# Patient Record
Sex: MALE | Race: WHITE | Employment: FULL TIME | URBAN - METROPOLITAN AREA
[De-identification: names, ages, dates, MRNs, and addresses within clinical notes are randomized per-mention and may not be internally consistent; named-entity substitution may affect disease eponyms.]

---

## 2022-01-25 ENCOUNTER — HOSPITAL ENCOUNTER (EMERGENCY)
Age: 52
Discharge: HOME | End: 2022-01-25
Attending: EMERGENCY MEDICINE | Admitting: EMERGENCY MEDICINE
Payer: COMMERCIAL

## 2022-01-25 ENCOUNTER — NURSE TRIAGE (OUTPATIENT)
Dept: OTHER | Facility: CLINIC | Age: 52
End: 2022-01-25

## 2022-01-25 ENCOUNTER — APPOINTMENT (OUTPATIENT)
Dept: VASCULAR SURGERY | Age: 52
End: 2022-01-25
Attending: EMERGENCY MEDICINE
Payer: COMMERCIAL

## 2022-01-25 VITALS
OXYGEN SATURATION: 97 % | TEMPERATURE: 98.5 F | RESPIRATION RATE: 18 BRPM | DIASTOLIC BLOOD PRESSURE: 79 MMHG | SYSTOLIC BLOOD PRESSURE: 149 MMHG | HEART RATE: 107 BPM

## 2022-01-25 DIAGNOSIS — I82.4Y2 ACUTE DEEP VEIN THROMBOSIS (DVT) OF PROXIMAL VEIN OF LEFT LOWER EXTREMITY (HCC): Primary | ICD-10-CM

## 2022-01-25 LAB
ALBUMIN SERPL-MCNC: 4.1 G/DL (ref 3.5–5)
ALBUMIN/GLOB SERPL: 1.1 {RATIO} (ref 1.1–2.2)
ALP SERPL-CCNC: 82 U/L (ref 45–117)
ALT SERPL-CCNC: 27 U/L (ref 12–78)
ANION GAP SERPL CALC-SCNC: 6 MMOL/L (ref 5–15)
APTT PPP: 27.9 SEC (ref 22.1–31)
AST SERPL-CCNC: 18 U/L (ref 15–37)
BASOPHILS # BLD: 0 K/UL (ref 0–0.1)
BASOPHILS NFR BLD: 0 % (ref 0–1)
BILIRUB SERPL-MCNC: 0.7 MG/DL (ref 0.2–1)
BUN SERPL-MCNC: 11 MG/DL (ref 6–20)
BUN/CREAT SERPL: 12 (ref 12–20)
CALCIUM SERPL-MCNC: 9.5 MG/DL (ref 8.5–10.1)
CHLORIDE SERPL-SCNC: 103 MMOL/L (ref 97–108)
CO2 SERPL-SCNC: 29 MMOL/L (ref 21–32)
COMMENT, HOLDF: NORMAL
CREAT SERPL-MCNC: 0.94 MG/DL (ref 0.7–1.3)
DIFFERENTIAL METHOD BLD: NORMAL
EOSINOPHIL # BLD: 0.2 K/UL (ref 0–0.4)
EOSINOPHIL NFR BLD: 2 % (ref 0–7)
ERYTHROCYTE [DISTWIDTH] IN BLOOD BY AUTOMATED COUNT: 11.9 % (ref 11.5–14.5)
GLOBULIN SER CALC-MCNC: 3.8 G/DL (ref 2–4)
GLUCOSE SERPL-MCNC: 102 MG/DL (ref 65–100)
HCT VFR BLD AUTO: 47.3 % (ref 36.6–50.3)
HGB BLD-MCNC: 16.2 G/DL (ref 12.1–17)
IMM GRANULOCYTES # BLD AUTO: 0 K/UL (ref 0–0.04)
IMM GRANULOCYTES NFR BLD AUTO: 0 % (ref 0–0.5)
INR PPP: 1 (ref 0.9–1.1)
LYMPHOCYTES # BLD: 1.9 K/UL (ref 0.8–3.5)
LYMPHOCYTES NFR BLD: 26 % (ref 12–49)
MCH RBC QN AUTO: 31.3 PG (ref 26–34)
MCHC RBC AUTO-ENTMCNC: 34.2 G/DL (ref 30–36.5)
MCV RBC AUTO: 91.5 FL (ref 80–99)
MONOCYTES # BLD: 0.7 K/UL (ref 0–1)
MONOCYTES NFR BLD: 9 % (ref 5–13)
NEUTS SEG # BLD: 4.4 K/UL (ref 1.8–8)
NEUTS SEG NFR BLD: 63 % (ref 32–75)
NRBC # BLD: 0 K/UL (ref 0–0.01)
NRBC BLD-RTO: 0 PER 100 WBC
PLATELET # BLD AUTO: 163 K/UL (ref 150–400)
PMV BLD AUTO: 10.2 FL (ref 8.9–12.9)
POTASSIUM SERPL-SCNC: 3.9 MMOL/L (ref 3.5–5.1)
PROT SERPL-MCNC: 7.9 G/DL (ref 6.4–8.2)
PROTHROMBIN TIME: 10.9 SEC (ref 9–11.1)
RBC # BLD AUTO: 5.17 M/UL (ref 4.1–5.7)
SAMPLES BEING HELD,HOLD: NORMAL
SODIUM SERPL-SCNC: 138 MMOL/L (ref 136–145)
THERAPEUTIC RANGE,PTTT: NORMAL SECS (ref 58–77)
WBC # BLD AUTO: 7.2 K/UL (ref 4.1–11.1)

## 2022-01-25 PROCEDURE — 99282 EMERGENCY DEPT VISIT SF MDM: CPT

## 2022-01-25 PROCEDURE — 36415 COLL VENOUS BLD VENIPUNCTURE: CPT

## 2022-01-25 PROCEDURE — 80053 COMPREHEN METABOLIC PANEL: CPT

## 2022-01-25 PROCEDURE — 85025 COMPLETE CBC W/AUTO DIFF WBC: CPT

## 2022-01-25 PROCEDURE — 85730 THROMBOPLASTIN TIME PARTIAL: CPT

## 2022-01-25 PROCEDURE — 85610 PROTHROMBIN TIME: CPT

## 2022-01-25 PROCEDURE — 93971 EXTREMITY STUDY: CPT

## 2022-01-25 RX ORDER — APIXABAN 5 MG (74)
KIT ORAL
Qty: 1 DOSE PACK | Refills: 0 | Status: SHIPPED | OUTPATIENT
Start: 2022-01-25 | End: 2022-01-25 | Stop reason: SDUPTHER

## 2022-01-25 RX ORDER — APIXABAN 5 MG (74)
KIT ORAL
Qty: 1 DOSE PACK | Refills: 0 | Status: SHIPPED | OUTPATIENT
Start: 2022-01-25 | End: 2022-01-28 | Stop reason: DRUGHIGH

## 2022-01-25 NOTE — ED PROVIDER NOTES
Neida Sacks is a 55 yo M with no significant medical history who presents to the ED with LLE Swelling. He states that he has had problems with swelling in his left leg for several years that would get worse when he travels. About a week ago he noticed a more severe pain in his right calf which lasted several minutes but since has been more of his typical dull ache but he has noticed increased swelling. He tried to set up a new patient appointment with the [facility] and was advised to come to the ED for evaluation. He denies chest pain or shortness of breath. History reviewed. No pertinent past medical history. Past Surgical History:   Procedure Laterality Date    HX ORTHOPAEDIC      HX SHOULDER ARTHROSCOPY           History reviewed. No pertinent family history. Social History     Socioeconomic History    Marital status:      Spouse name: Not on file    Number of children: Not on file    Years of education: Not on file    Highest education level: Not on file   Occupational History    Not on file   Tobacco Use    Smoking status: Never Smoker    Smokeless tobacco: Never Used   Substance and Sexual Activity    Alcohol use: Yes     Comment: socially    Drug use: Not Currently    Sexual activity: Not on file   Other Topics Concern    Not on file   Social History Narrative    Not on file     Social Determinants of Health     Financial Resource Strain:     Difficulty of Paying Living Expenses: Not on file   Food Insecurity:     Worried About Running Out of Food in the Last Year: Not on file    Jc of Food in the Last Year: Not on file   Transportation Needs:     Lack of Transportation (Medical): Not on file    Lack of Transportation (Non-Medical):  Not on file   Physical Activity:     Days of Exercise per Week: Not on file    Minutes of Exercise per Session: Not on file   Stress:     Feeling of Stress : Not on file   Social Connections:     Frequency of Communication with Friends and Family: Not on file    Frequency of Social Gatherings with Friends and Family: Not on file    Attends Taoist Services: Not on file    Active Member of Clubs or Organizations: Not on file    Attends Club or Organization Meetings: Not on file    Marital Status: Not on file   Intimate Partner Violence:     Fear of Current or Ex-Partner: Not on file    Emotionally Abused: Not on file    Physically Abused: Not on file    Sexually Abused: Not on file   Housing Stability:     Unable to Pay for Housing in the Last Year: Not on file    Number of [place] in the Last Year: Not on file    Unstable Housing in the Last Year: Not on file         ALLERGIES: Patient has no known allergies. Review of Systems   Constitutional: Negative for fever. HENT: Negative for sore throat. Eyes: Negative for visual disturbance. Respiratory: Negative for cough. Cardiovascular: Positive for leg swelling (left). Negative for chest pain. Gastrointestinal: Negative for abdominal pain. Genitourinary: Negative for dysuria. Musculoskeletal: Negative for back pain. Skin: Negative for rash. Neurological: Negative for headaches. Vitals:    01/25/22 1136   BP: (!) 149/79   Pulse: (!) 107   Resp: 18   Temp: 98.5 °F (36.9 °C)   SpO2: 97%            Physical Exam  Vitals and nursing note reviewed. Constitutional:       General: He is not in acute distress. Appearance: He is well-developed. HENT:      Head: Normocephalic and atraumatic. Eyes:      Conjunctiva/sclera: Conjunctivae normal.   Neck:      Trachea: Phonation normal.   Cardiovascular:      Rate and Rhythm: Normal rate. Pulmonary:      Effort: Pulmonary effort is normal. No respiratory distress. Abdominal:      General: There is no distension. Musculoskeletal:         General: Normal range of motion. Cervical back: Normal range of motion. Left lower leg: Tenderness (calf) present.  Edema present. Skin:     General: Skin is warm and dry. Neurological:      Mental Status: He is alert. He is not disoriented. Motor: No abnormal muscle tone. MDM         5:17 PM  Vascular US significant for DVT from left mid and distal femoral vein to popliteal and gastroc veins. CBC anc CMP normal will treat with eliquis. Prescribed starter pack. Patient to follow-up with [facility] to continue treatment and establish primary care.      Procedures

## 2022-01-25 NOTE — TELEPHONE ENCOUNTER
Received call from Chino Mcqueen at [facility] with Red Flag Complaint. Subjective: Caller states \"It's very warm to the touch. 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 As I press around ankle bone there is indentation that fills back up, as well. \"     Current Symptoms:   LLE edema within 1-2 weeks has gotten worse from ankle to knee area with \"purplish discoloration\"    Pitting edema to ankle area, warm to the touch, and intermittently itchy     \"Achy feeling\" to ankle/calf area     Tightness in calf area especially as walking and stretching     Denies shortness of breath     Onset: 2 weeks ago; worsening     Associated Symptoms: NA    Pain Severity: N/A    Temperature: N/A    What has been tried: Stretching    LMP: NA Pregnant: NA    Recommended disposition: GO TO ED NOW. Care advice provided, patient verbalizes understanding; denies any other questions or concerns; instructed to call back for any new or worsening symptoms. Patient proceeding to closest Emergency Department    Attention Provider: Thank you for allowing me to participate in the care of your patient. The patient was connected to triage in response to information provided to the [facility]. Please do not respond through this encounter as the response is not directed to a shared pool.     Reason for Disposition   Thigh, calf, or ankle swelling in only one leg    Protocols used: LEG SWELLING AND EDEMA-ADULT-OH

## 2022-01-28 ENCOUNTER — OFFICE VISIT (OUTPATIENT)
Dept: PRIMARY CARE CLINIC | Age: 52
End: 2022-01-28
Payer: COMMERCIAL

## 2022-01-28 VITALS
TEMPERATURE: 98.4 F | HEIGHT: 74 IN | HEART RATE: 86 BPM | SYSTOLIC BLOOD PRESSURE: 128 MMHG | OXYGEN SATURATION: 97 % | DIASTOLIC BLOOD PRESSURE: 82 MMHG | RESPIRATION RATE: 16 BRPM | WEIGHT: 224 LBS | BODY MASS INDEX: 28.75 KG/M2

## 2022-01-28 DIAGNOSIS — I82.4Y2 ACUTE DEEP VEIN THROMBOSIS (DVT) OF PROXIMAL VEIN OF LEFT LOWER EXTREMITY (HCC): Primary | ICD-10-CM

## 2022-01-28 PROCEDURE — 99203 OFFICE O/P NEW LOW 30 MIN: CPT | Performed by: FAMILY MEDICINE

## 2022-01-28 RX ORDER — ACETAMINOPHEN 325 MG/1
TABLET ORAL
COMMUNITY

## 2022-01-28 NOTE — PROGRESS NOTES
HPI     Chief Complaint   Patient presents with   [address]     has blood clots in left leg- swelling- red- no brusing or bleeding per pt     He is a 54 y.o. male who presents for establishing care. Patient went to the ER and was diagnosed with a blood clot in his L leg. States he was started on Eliquis starter pack. Denies bleeding, bruising on Eliquis. States he used to travel a lot pre COVID and would always have swelling when he would travel. States typically he would exercise and it would go away. States he is very sedentary for his job. 2 weeks ago he started having swelling in L leg but it never went away. Started to have aching. States he had been up the night before working at his desk when it started swelling but no travel around that time. No recent surgeries. States last travel was Dec 17-Dec 19 was from [place] to [facility] which is about a 2 hour flight. States his Dad  of a MI. Mom had emphysema. GF had a MI. No autoimmune conditions in the family. Review of Systems   Respiratory: Negative for shortness of breath. Cardiovascular: Negative for chest pain. Reviewed PmHx, RxHx, FmHx, SocHx, AllgHx and updated and dated in the chart. Physical Exam:  Visit Vitals  /82   Pulse 86   Temp 98.4 °F (36.9 °C) (Temporal)   Resp 16   Ht 6' 2\" (1.88 m)   Wt 224 lb (101.6 kg)   SpO2 97%   BMI 28.76 kg/m²     Physical Exam  Vitals and nursing note reviewed. Constitutional:       General: He is not in acute distress. Appearance: Normal appearance. He is not ill-appearing. Cardiovascular:      Rate and Rhythm: Normal rate and regular rhythm. Heart sounds: No murmur heard. Pulmonary:      Effort: Pulmonary effort is normal. No respiratory distress. Breath sounds: Normal breath sounds. Musculoskeletal:      Right lower leg: No edema. Left lower leg: Edema present. Comments: LLE 1+, mildly erythematous. Post tibial pulse present BL.   L post tibial pulse with doppler - 84  L dorsalis pedis pulse with doppler - 80   Skin:     General: Skin is warm. Neurological:      General: No focal deficit present. Mental Status: He is alert. Psychiatric:         Mood and Affect: Mood normal.         Behavior: Behavior normal.       No results found for this or any previous visit (from the past 12 hour(s)). Assessment / Plan     Diagnoses and all orders for this visit:    1. Acute deep vein thrombosis (DVT) of proximal vein of left lower extremity (HCC)  -     apixaban (Eliquis) 5 mg tablet; Take 1 Tablet by mouth two (2) times a day. -     REFERRAL TO HEMATOLOGY       Discussed do not use NSAIDs with AC. Needs to follow up with Heme as can not identify provoking factor and guidance on how long he should be on [facility] or if he needs further testing. VSS today. Discussed ER precautions. I have discussed the diagnosis with the patient and the intended plan as seen in the above orders. The patient has received an after-visit summary and questions were answered concerning future plans. I have discussed medication side effects and warnings with the patient as well.     Briseida Huynh, DO

## 2022-01-28 NOTE — PROGRESS NOTES
Chief Complaint   Patient presents with   [address]     has blood clots in left leg       Health Maintenance Due   Topic    Hepatitis C Screening     DTaP/Tdap/Td series (1 - Tdap)    Lipid Screen     Colorectal Cancer Screening Combo     Flu Vaccine (1)        1. Have you been to the ER, urgent care clinic since your last visit? Hospitalized since your last visit? Yes Reason for visit: 01/25/21- [facility]- blood clots    2. Have you seen or consulted any other health care providers outside of the [address] [place] [address] since your last visit? Include any pap smears or colon screening.  No    Visit Vitals  BP (!) 134/94 (BP 1 Location: Left upper arm, BP Patient Position: Sitting)   Pulse 86   Temp 98.4 °F (36.9 °C) (Temporal)   Resp 16   Ht 6' 2\" (1.88 m)   Wt 224 lb (101.6 kg)   SpO2 97%   BMI 28.76 kg/m²

## 2022-01-28 NOTE — PATIENT INSTRUCTIONS
Deep Vein Thrombosis: Care Instructions  Overview     A deep vein thrombosis (DVT) is a blood clot in certain veins, usually in the legs, pelvis, or arms. Blood clots in these veins need to be treated because they can get bigger, break loose, and travel through the bloodstream to the lungs. A blood clot in a lung can be life-threatening. The doctor may have given you a blood thinner (anticoagulant). A blood thinner can stop the blood clot from growing larger and prevent new clots from forming. You will need to take a blood thinner for at least 3 months. The doctor has checked you carefully, but problems can develop later. If you notice any problems or new symptoms, get medical treatment right away. Follow-up care is a key part of your treatment and safety. Be sure to make and go to all appointments, and call your doctor if you are having problems. It's also a good idea to know your test results and keep a list of the medicines you take. How can you care for yourself at home? · Take your medicines exactly as prescribed. Call your doctor if you think you are having a problem with your medicine. · If you are taking a blood thinner, be sure you get instructions about how to take your medicine safely. Blood thinners can cause serious bleeding problems. · Try to walk several times a day. · Wear compression stockings if your doctor recommends them. These stockings are tighter at the feet than on the legs. They may reduce pain and swelling in your legs. But there are different types of stockings, and they need to fit right. So your doctor will recommend what you need. · When you sit, use a pillow to raise the arm or leg that has the blood clot. Try to keep it above the level of your heart. When should you call for help? Call 911 anytime you think you may need emergency care.  For example, call if:    · You passed out (lost consciousness).     · You have symptoms of a blood clot in your lung (called a pulmonary embolism). These include:  ? Sudden chest pain. ? Trouble breathing. ? Coughing up blood. Call your doctor now or seek immediate medical care if:    · You have new or worse trouble breathing.     · You are dizzy or lightheaded, or you feel like you may faint.     · You have symptoms of a blood clot in your arm or leg. These may include:  ? Pain in the arm, calf, back of the knee, thigh, or groin. ? Redness and swelling in the arm, leg, or groin. Watch closely for changes in your health, and be sure to contact your doctor if:    · You do not get better as expected. Where can you learn more? Go to http://www.gray.com/  Enter Z760 in the search box to learn more about \"Deep Vein Thrombosis: Care Instructions. \"  Current as of: July 6, 2021               Content Version: 13.0  © 2006-2021 [employer]. Care instructions adapted under license by [employer] (which disclaims liability or warranty for this information).  If you have questions about a medical condition or this instruction, always ask your healthcare professional. [place] [address] any warranty or liability for your use of this information.

## 2022-02-10 ENCOUNTER — APPOINTMENT (OUTPATIENT)
Dept: VASCULAR SURGERY | Age: 52
DRG: 270 | End: 2022-02-10
Attending: EMERGENCY MEDICINE
Payer: COMMERCIAL

## 2022-02-10 ENCOUNTER — TELEPHONE (OUTPATIENT)
Dept: PRIMARY CARE CLINIC | Age: 52
End: 2022-02-10

## 2022-02-10 ENCOUNTER — HOSPITAL ENCOUNTER (INPATIENT)
Age: 52
LOS: 2 days | Discharge: HOME | DRG: 270 | End: 2022-02-12
Attending: EMERGENCY MEDICINE | Admitting: INTERNAL MEDICINE
Payer: COMMERCIAL

## 2022-02-10 DIAGNOSIS — I82.4Y2 ACUTE DEEP VEIN THROMBOSIS (DVT) OF PROXIMAL VEIN OF LEFT LOWER EXTREMITY (HCC): Primary | ICD-10-CM

## 2022-02-10 PROBLEM — I82.402 ACUTE DEEP VEIN THROMBOSIS (DVT) OF LEFT LOWER EXTREMITY (HCC): Status: ACTIVE | Noted: 2022-02-10

## 2022-02-10 LAB
ABO + RH BLD: NORMAL
ALBUMIN SERPL-MCNC: 4.4 G/DL (ref 3.5–5)
ALBUMIN/GLOB SERPL: 1.1 {RATIO} (ref 1.1–2.2)
ALP SERPL-CCNC: 71 U/L (ref 45–117)
ALT SERPL-CCNC: 26 U/L (ref 12–78)
ANION GAP SERPL CALC-SCNC: 3 MMOL/L (ref 5–15)
APTT PPP: 28.2 SEC (ref 22.1–31)
APTT PPP: 28.4 SEC (ref 22.1–31)
AST SERPL-CCNC: 15 U/L (ref 15–37)
BASOPHILS # BLD: 0 K/UL (ref 0–0.1)
BASOPHILS NFR BLD: 1 % (ref 0–1)
BILIRUB SERPL-MCNC: 0.7 MG/DL (ref 0.2–1)
BLOOD GROUP ANTIBODIES SERPL: NORMAL
BUN SERPL-MCNC: 12 MG/DL (ref 6–20)
BUN/CREAT SERPL: 14 (ref 12–20)
CALCIUM SERPL-MCNC: 9.5 MG/DL (ref 8.5–10.1)
CHLORIDE SERPL-SCNC: 103 MMOL/L (ref 97–108)
CO2 SERPL-SCNC: 29 MMOL/L (ref 21–32)
COMMENT, HOLDF: NORMAL
COVID-19 RAPID TEST, COVR: NOT DETECTED
CREAT SERPL-MCNC: 0.86 MG/DL (ref 0.7–1.3)
DIFFERENTIAL METHOD BLD: NORMAL
EOSINOPHIL # BLD: 0.1 K/UL (ref 0–0.4)
EOSINOPHIL NFR BLD: 2 % (ref 0–7)
ERYTHROCYTE [DISTWIDTH] IN BLOOD BY AUTOMATED COUNT: 12.3 % (ref 11.5–14.5)
GLOBULIN SER CALC-MCNC: 4 G/DL (ref 2–4)
GLUCOSE SERPL-MCNC: 90 MG/DL (ref 65–100)
HCT VFR BLD AUTO: 47.2 % (ref 36.6–50.3)
HGB BLD-MCNC: 15.5 G/DL (ref 12.1–17)
IMM GRANULOCYTES # BLD AUTO: 0 K/UL (ref 0–0.04)
IMM GRANULOCYTES NFR BLD AUTO: 0 % (ref 0–0.5)
LYMPHOCYTES # BLD: 2 K/UL (ref 0.8–3.5)
LYMPHOCYTES NFR BLD: 34 % (ref 12–49)
MCH RBC QN AUTO: 30.8 PG (ref 26–34)
MCHC RBC AUTO-ENTMCNC: 32.8 G/DL (ref 30–36.5)
MCV RBC AUTO: 93.8 FL (ref 80–99)
MONOCYTES # BLD: 0.5 K/UL (ref 0–1)
MONOCYTES NFR BLD: 9 % (ref 5–13)
NEUTS SEG # BLD: 3.3 K/UL (ref 1.8–8)
NEUTS SEG NFR BLD: 54 % (ref 32–75)
NRBC # BLD: 0 K/UL (ref 0–0.01)
NRBC BLD-RTO: 0 PER 100 WBC
PLATELET # BLD AUTO: 248 K/UL (ref 150–400)
PMV BLD AUTO: 10 FL (ref 8.9–12.9)
POTASSIUM SERPL-SCNC: 3.7 MMOL/L (ref 3.5–5.1)
PROT SERPL-MCNC: 8.4 G/DL (ref 6.4–8.2)
RBC # BLD AUTO: 5.03 M/UL (ref 4.1–5.7)
SAMPLES BEING HELD,HOLD: NORMAL
SODIUM SERPL-SCNC: 135 MMOL/L (ref 136–145)
SOURCE, COVRS: NORMAL
SPECIMEN EXP DATE BLD: NORMAL
THERAPEUTIC RANGE,PTTT: NORMAL SECS (ref 58–77)
THERAPEUTIC RANGE,PTTT: NORMAL SECS (ref 58–77)
WBC # BLD AUTO: 6.1 K/UL (ref 4.1–11.1)

## 2022-02-10 PROCEDURE — 65270000032 HC RM SEMIPRIVATE

## 2022-02-10 PROCEDURE — 74011250636 HC RX REV CODE- 250/636: Performed by: PHYSICIAN ASSISTANT

## 2022-02-10 PROCEDURE — 87635 SARS-COV-2 COVID-19 AMP PRB: CPT

## 2022-02-10 PROCEDURE — 93971 EXTREMITY STUDY: CPT

## 2022-02-10 PROCEDURE — 80053 COMPREHEN METABOLIC PANEL: CPT

## 2022-02-10 PROCEDURE — 85730 THROMBOPLASTIN TIME PARTIAL: CPT

## 2022-02-10 PROCEDURE — 36415 COLL VENOUS BLD VENIPUNCTURE: CPT

## 2022-02-10 PROCEDURE — 99284 EMERGENCY DEPT VISIT MOD MDM: CPT

## 2022-02-10 PROCEDURE — 85025 COMPLETE CBC W/AUTO DIFF WBC: CPT

## 2022-02-10 PROCEDURE — 86900 BLOOD TYPING SEROLOGIC ABO: CPT

## 2022-02-10 RX ORDER — ONDANSETRON 4 MG/1
4 TABLET, ORALLY DISINTEGRATING ORAL
Status: DISCONTINUED | OUTPATIENT
Start: 2022-02-10 | End: 2022-02-12

## 2022-02-10 RX ORDER — HEPARIN SODIUM 10000 [USP'U]/100ML
18-36 INJECTION, SOLUTION INTRAVENOUS
Status: DISCONTINUED | OUTPATIENT
Start: 2022-02-10 | End: 2022-02-10 | Stop reason: RX

## 2022-02-10 RX ORDER — ONDANSETRON 2 MG/ML
4 INJECTION INTRAMUSCULAR; INTRAVENOUS
Status: DISCONTINUED | OUTPATIENT
Start: 2022-02-10 | End: 2022-02-12

## 2022-02-10 RX ORDER — ACETAMINOPHEN 325 MG/1
650 TABLET ORAL
Status: DISCONTINUED | OUTPATIENT
Start: 2022-02-10 | End: 2022-02-10

## 2022-02-10 RX ORDER — HEPARIN SODIUM 1000 [USP'U]/ML
80 INJECTION, SOLUTION INTRAVENOUS; SUBCUTANEOUS ONCE
Status: COMPLETED | OUTPATIENT
Start: 2022-02-10 | End: 2022-02-10

## 2022-02-10 RX ORDER — MORPHINE SULFATE 2 MG/ML
2 INJECTION, SOLUTION INTRAMUSCULAR; INTRAVENOUS
Status: DISCONTINUED | OUTPATIENT
Start: 2022-02-10 | End: 2022-02-12

## 2022-02-10 RX ORDER — POLYETHYLENE GLYCOL 3350 17 G/17G
17 POWDER, FOR SOLUTION ORAL DAILY PRN
Status: DISCONTINUED | OUTPATIENT
Start: 2022-02-10 | End: 2022-02-12 | Stop reason: HOSPADM

## 2022-02-10 RX ORDER — SODIUM CHLORIDE 0.9 % (FLUSH) 0.9 %
5-40 SYRINGE (ML) INJECTION AS NEEDED
Status: DISCONTINUED | OUTPATIENT
Start: 2022-02-10 | End: 2022-02-12 | Stop reason: HOSPADM

## 2022-02-10 RX ORDER — SODIUM CHLORIDE 0.9 % (FLUSH) 0.9 %
5-40 SYRINGE (ML) INJECTION EVERY 8 HOURS
Status: DISCONTINUED | OUTPATIENT
Start: 2022-02-10 | End: 2022-02-12 | Stop reason: HOSPADM

## 2022-02-10 RX ORDER — ACETAMINOPHEN 325 MG/1
650 TABLET ORAL
Status: DISCONTINUED | OUTPATIENT
Start: 2022-02-10 | End: 2022-02-12 | Stop reason: HOSPADM

## 2022-02-10 RX ORDER — ACETAMINOPHEN 650 MG/1
650 SUPPOSITORY RECTAL
Status: DISCONTINUED | OUTPATIENT
Start: 2022-02-10 | End: 2022-02-12

## 2022-02-10 RX ADMIN — HEPARIN SODIUM 14 UNITS/KG/HR: 1000 INJECTION INTRAVENOUS; SUBCUTANEOUS at 21:32

## 2022-02-10 RX ADMIN — HEPARIN SODIUM 8130 UNITS: 1000 INJECTION, SOLUTION INTRAVENOUS; SUBCUTANEOUS at 21:31

## 2022-02-10 NOTE — TELEPHONE ENCOUNTER
Call placed to patient at recommendation of NP unable to reach patient on the phone. spoke with spouse she stated he was on a conference call. Explained that with the symptoms he was experiencing it is suggested that he go to ER.    She stated she would let the patient know

## 2022-02-10 NOTE — ED PROVIDER NOTES
54year old nguyen lindsay presenting to the ED for leg swelling. Was seen here on 1/25 and diagnosed with DVT. Reports that he has beem compliant with eliquis. Went to see his PCP on 1/28. Has an appt with hematology Next Thursday but notes that pain and swelling have been increasing, called his PCP and was referred here. Patient denies any chest pain, shortness of breath, hemoptysis. No known instigating event, no recent immobilization. No family history of coagulopathy. Patient notes a moderate aching pain in the left lower leg, worse with standing. Notes that swelling somewhat improved when he elevates it overnight but then returns. PMHx: possible hx elevated BP, dx with DVT on 1/25  PSx: shoulder arthroscopy    The history is provided by the patient and medical records. Leg Swelling  Pertinent negatives include no chest pain and no shortness of breath. No past medical history on file. Past Surgical History:   Procedure Laterality Date    HX ORTHOPAEDIC      HX SHOULDER ARTHROSCOPY           No family history on file.     Social History     Socioeconomic History    Marital status:      Spouse name: Not on file    Number of children: Not on file    Years of education: Not on file    Highest education level: Not on file   Occupational History    Not on file   Tobacco Use    Smoking status: Never Smoker    Smokeless tobacco: Never Used   Vaping Use    Vaping Use: Never used   Substance and Sexual Activity    Alcohol use: Yes     Comment: socially    Drug use: Not Currently    Sexual activity: Not on file   Other Topics Concern    Not on file   Social History Narrative    Not on file     Social Determinants of Health     Financial Resource Strain:     Difficulty of Paying Living Expenses: Not on file   Food Insecurity:     Worried About Running Out of Food in the Last Year: Not on file    Jc of Food in the Last Year: Not on file   Transportation Needs:     Lack of Transportation (Medical): Not on file    Lack of Transportation (Non-Medical): Not on file   Physical Activity:     Days of Exercise per Week: Not on file    Minutes of Exercise per Session: Not on file   Stress:     Feeling of Stress : Not on file   Social Connections:     Frequency of Communication with Friends and Family: Not on file    Frequency of Social Gatherings with Friends and Family: Not on file    Attends Worship Services: Not on file    Active Member of [address] [place] [address] or Organizations: Not on file    Attends Club or Organization Meetings: Not on file    Marital Status: Not on file   Intimate Partner Violence:     Fear of Current or Ex-Partner: Not on file    Emotionally Abused: Not on file    Physically Abused: Not on file    Sexually Abused: Not on file   Housing Stability:     Unable to Pay for Housing in the Last Year: Not on file    Number of [place] in the Last Year: Not on file    Unstable Housing in the Last Year: Not on file         ALLERGIES: Patient has no known allergies. Review of Systems   Constitutional: Negative for fever. HENT: Negative for facial swelling. Respiratory: Negative for shortness of breath. Cardiovascular: Positive for leg swelling. Negative for chest pain. Gastrointestinal: Negative for vomiting. Musculoskeletal: Positive for myalgias. Skin: Negative for wound. Neurological: Negative for syncope. All other systems reviewed and are negative. Vitals:    02/10/22 1635 02/10/22 1947   BP: (!) 144/93    Pulse: 80    Resp: 18    Temp: 98.2 °F (36.8 °C)    SpO2: 99%    Weight:  101.6 kg (224 lb)   Height:  6' 2\" (1.88 m)            Physical Exam  Vitals and nursing note reviewed. Constitutional:       General: He is not in acute distress. Appearance: He is well-developed. Comments: Pleasant, well-appearing, no distress   HENT:      Head: Normocephalic and atraumatic.       Right Ear: External ear normal.      Left Ear: External ear normal.   Eyes:      General: No scleral icterus. Conjunctiva/sclera: Conjunctivae normal.   Neck:      Trachea: No tracheal deviation. Cardiovascular:      Rate and Rhythm: Normal rate and regular rhythm. Heart sounds: Normal heart sounds. No murmur heard. No friction rub. No gallop. Pulmonary:      Effort: Pulmonary effort is normal. No respiratory distress. Breath sounds: Normal breath sounds. No stridor. No wheezing. Abdominal:      General: There is no distension. Palpations: Abdomen is soft. Musculoskeletal:         General: Swelling present. Normal range of motion. Cervical back: Neck supple. Left lower leg: Edema present. Comments: Left lower leg: Significantly swollen when compared to the right.  + Increase superficial varicosities. Difficult to palpate pedal pulses, however they are appreciable with Doppler, the foot is warm and well-perfused with brisk capillary refill. Skin:     General: Skin is warm and dry. Neurological:      Mental Status: He is alert and oriented to person, place, and time. Psychiatric:         Behavior: Behavior normal.          MDM  Number of Diagnoses or Management Options  Acute deep vein thrombosis (DVT) of proximal vein of left lower extremity (HCC)  Diagnosis management comments: 47-year-old male presenting to the ED for increasing pain, swelling in the left leg, diagnosed with DVT on 1/25 and started on appropriate anticoagulation. Duplex today shows no increase in length of known clot, however areas that were previously nonocclusive are now occlusive. Discussed with vascular, recommended heparin drip, will see in the morning to determine if thrombectomy would be indicated. Medicine consulted for admission.        Amount and/or Complexity of Data Reviewed  Clinical lab tests: ordered and reviewed  Tests in the radiology section of CPT®: ordered and reviewed  Review and summarize past medical records: yes  Discuss the patient with other providers: yes (Dr. Wil Pathak, ED attending. Vascular surgery.)    Risk of Complications, Morbidity, and/or Mortality  General comments: Total critical care time spent exclusive of procedures:  40 minutes - chart review from prior visit, failed anticoagulation, vascular surgery consult, heparin drip           Procedures               Discussed with Dr. Aliyah Ortiz, vascular. Agrees with admission for heparin drip, possible thrombolysis in the AM.  Will consult medicine. REBEKAH Mercado  7:37 PM    Perfect Serve Consult for Admission  7:53 PM    ED Room Number: ER10/10  Patient Name and age:  Bev Hernandez 54 y.o.  male  Working Diagnosis:   1.  Acute deep vein thrombosis (DVT) of proximal vein of left lower extremity ([address]        COVID-19 Suspicion:  no  Sepsis present:  no  Reassessment needed: no  Code Status:  Full Code  Readmission: no  Isolation Requirements:  no  Recommended Level of Care:  telemetry  Department:[facility] Adult ED - 21   Other:  Dx with DVT on 1/25 - started on eliquis - has been compliant - came in for worsening symptoms - duplex shows more occlusive thrombus - discussed with vascular surgery, wants pt started on heparin drip, may need procedure tomorrow

## 2022-02-10 NOTE — ED TRIAGE NOTES
Patient reports left leg DVT diagnosed 2.5 weeks that has gotten more swollen over the last week. On day 17 of Eliquis.  Denies shortness of breath

## 2022-02-11 ENCOUNTER — ANESTHESIA (OUTPATIENT)
Dept: CARDIOTHORACIC SURGERY | Age: 52
DRG: 270 | End: 2022-02-11
Payer: COMMERCIAL

## 2022-02-11 ENCOUNTER — ANESTHESIA EVENT (OUTPATIENT)
Dept: CARDIOTHORACIC SURGERY | Age: 52
DRG: 270 | End: 2022-02-11
Payer: COMMERCIAL

## 2022-02-11 ENCOUNTER — APPOINTMENT (OUTPATIENT)
Dept: GENERAL RADIOLOGY | Age: 52
DRG: 270 | End: 2022-02-11
Attending: SURGERY
Payer: COMMERCIAL

## 2022-02-11 ENCOUNTER — APPOINTMENT (OUTPATIENT)
Dept: CT IMAGING | Age: 52
DRG: 270 | End: 2022-02-11
Attending: INTERNAL MEDICINE
Payer: COMMERCIAL

## 2022-02-11 LAB
ALBUMIN SERPL-MCNC: 3.3 G/DL (ref 3.5–5)
ALBUMIN/GLOB SERPL: 1.2 {RATIO} (ref 1.1–2.2)
ALP SERPL-CCNC: 53 U/L (ref 45–117)
ALT SERPL-CCNC: 19 U/L (ref 12–78)
ANION GAP SERPL CALC-SCNC: 5 MMOL/L (ref 5–15)
APTT PPP: 56.9 SEC (ref 22.1–31)
APTT PPP: 66.6 SEC (ref 22.1–31)
AST SERPL-CCNC: 14 U/L (ref 15–37)
ATRIAL RATE: 66 BPM
BASOPHILS # BLD: 0 K/UL (ref 0–0.1)
BASOPHILS NFR BLD: 1 % (ref 0–1)
BILIRUB SERPL-MCNC: 0.7 MG/DL (ref 0.2–1)
BUN SERPL-MCNC: 10 MG/DL (ref 6–20)
BUN/CREAT SERPL: 14 (ref 12–20)
CALCIUM SERPL-MCNC: 8.6 MG/DL (ref 8.5–10.1)
CALCULATED P AXIS, ECG09: 16 DEGREES
CALCULATED R AXIS, ECG10: 60 DEGREES
CALCULATED T AXIS, ECG11: 43 DEGREES
CHLORIDE SERPL-SCNC: 108 MMOL/L (ref 97–108)
CHOLEST SERPL-MCNC: 183 MG/DL
CO2 SERPL-SCNC: 25 MMOL/L (ref 21–32)
CREAT SERPL-MCNC: 0.7 MG/DL (ref 0.7–1.3)
DIAGNOSIS, 93000: NORMAL
DIFFERENTIAL METHOD BLD: NORMAL
EOSINOPHIL # BLD: 0.2 K/UL (ref 0–0.4)
EOSINOPHIL NFR BLD: 3 % (ref 0–7)
ERYTHROCYTE [DISTWIDTH] IN BLOOD BY AUTOMATED COUNT: 12 % (ref 11.5–14.5)
GLOBULIN SER CALC-MCNC: 2.8 G/DL (ref 2–4)
GLUCOSE SERPL-MCNC: 100 MG/DL (ref 65–100)
HCT VFR BLD AUTO: 40.4 % (ref 36.6–50.3)
HDLC SERPL-MCNC: 85 MG/DL
HDLC SERPL: 2.2 {RATIO} (ref 0–5)
HGB BLD-MCNC: 13.5 G/DL (ref 12.1–17)
IMM GRANULOCYTES # BLD AUTO: 0 K/UL (ref 0–0.04)
IMM GRANULOCYTES NFR BLD AUTO: 0 % (ref 0–0.5)
LDLC SERPL CALC-MCNC: 82.6 MG/DL (ref 0–100)
LYMPHOCYTES # BLD: 2.1 K/UL (ref 0.8–3.5)
LYMPHOCYTES NFR BLD: 41 % (ref 12–49)
MAGNESIUM SERPL-MCNC: 2 MG/DL (ref 1.6–2.4)
MCH RBC QN AUTO: 31.2 PG (ref 26–34)
MCHC RBC AUTO-ENTMCNC: 33.4 G/DL (ref 30–36.5)
MCV RBC AUTO: 93.3 FL (ref 80–99)
MONOCYTES # BLD: 0.5 K/UL (ref 0–1)
MONOCYTES NFR BLD: 10 % (ref 5–13)
NEUTS SEG # BLD: 2.3 K/UL (ref 1.8–8)
NEUTS SEG NFR BLD: 45 % (ref 32–75)
NRBC # BLD: 0 K/UL (ref 0–0.01)
NRBC BLD-RTO: 0 PER 100 WBC
P-R INTERVAL, ECG05: 164 MS
PHOSPHATE SERPL-MCNC: 3.6 MG/DL (ref 2.6–4.7)
PLATELET # BLD AUTO: 207 K/UL (ref 150–400)
PMV BLD AUTO: 9.9 FL (ref 8.9–12.9)
POTASSIUM SERPL-SCNC: 3.4 MMOL/L (ref 3.5–5.1)
PROT SERPL-MCNC: 6.1 G/DL (ref 6.4–8.2)
Q-T INTERVAL, ECG07: 406 MS
QRS DURATION, ECG06: 92 MS
QTC CALCULATION (BEZET), ECG08: 425 MS
RBC # BLD AUTO: 4.33 M/UL (ref 4.1–5.7)
SODIUM SERPL-SCNC: 138 MMOL/L (ref 136–145)
THERAPEUTIC RANGE,PTTT: ABNORMAL SECS (ref 58–77)
THERAPEUTIC RANGE,PTTT: ABNORMAL SECS (ref 58–77)
TRIGL SERPL-MCNC: 77 MG/DL (ref ?–150)
TROPONIN-HIGH SENSITIVITY: 5 NG/L (ref 0–76)
TROPONIN-HIGH SENSITIVITY: 5 NG/L (ref 0–76)
TSH SERPL DL<=0.05 MIU/L-ACNC: 1.37 UIU/ML (ref 0.36–3.74)
VENTRICULAR RATE, ECG03: 66 BPM
VLDLC SERPL CALC-MCNC: 15.4 MG/DL
WBC # BLD AUTO: 5.1 K/UL (ref 4.1–11.1)

## 2022-02-11 PROCEDURE — 71275 CT ANGIOGRAPHY CHEST: CPT

## 2022-02-11 PROCEDURE — 65610000006 HC RM INTENSIVE CARE

## 2022-02-11 PROCEDURE — 77030002916 HC SUT ETHLN J&J -A: Performed by: SURGERY

## 2022-02-11 PROCEDURE — 74011250636 HC RX REV CODE- 250/636: Performed by: NURSE ANESTHETIST, CERTIFIED REGISTERED

## 2022-02-11 PROCEDURE — 74011250636 HC RX REV CODE- 250/636: Performed by: INTERNAL MEDICINE

## 2022-02-11 PROCEDURE — 76060000033 HC ANESTHESIA 1 TO 1.5 HR: Performed by: SURGERY

## 2022-02-11 PROCEDURE — 84484 ASSAY OF TROPONIN QUANT: CPT

## 2022-02-11 PROCEDURE — 77030002987 HC SUT PROL J&J -B: Performed by: SURGERY

## 2022-02-11 PROCEDURE — 74011000636 HC RX REV CODE- 636: Performed by: RADIOLOGY

## 2022-02-11 PROCEDURE — 74011250636 HC RX REV CODE- 250/636

## 2022-02-11 PROCEDURE — 74011250637 HC RX REV CODE- 250/637: Performed by: STUDENT IN AN ORGANIZED HEALTH CARE EDUCATION/TRAINING PROGRAM

## 2022-02-11 PROCEDURE — 74011000250 HC RX REV CODE- 250: Performed by: INTERNAL MEDICINE

## 2022-02-11 PROCEDURE — 74177 CT ABD & PELVIS W/CONTRAST: CPT

## 2022-02-11 PROCEDURE — 80061 LIPID PANEL: CPT

## 2022-02-11 PROCEDURE — C1894 INTRO/SHEATH, NON-LASER: HCPCS | Performed by: SURGERY

## 2022-02-11 PROCEDURE — 77030002986 HC SUT PROL J&J -A: Performed by: SURGERY

## 2022-02-11 PROCEDURE — 74011250636 HC RX REV CODE- 250/636: Performed by: SURGERY

## 2022-02-11 PROCEDURE — 36415 COLL VENOUS BLD VENIPUNCTURE: CPT

## 2022-02-11 PROCEDURE — 85730 THROMBOPLASTIN TIME PARTIAL: CPT

## 2022-02-11 PROCEDURE — 84443 ASSAY THYROID STIM HORMONE: CPT

## 2022-02-11 PROCEDURE — 93005 ELECTROCARDIOGRAM TRACING: CPT

## 2022-02-11 PROCEDURE — 77030008462 HC STPLR SKN PROX J&J -A: Performed by: SURGERY

## 2022-02-11 PROCEDURE — 77030014008 HC SPNG HEMSTAT J&J -C: Performed by: SURGERY

## 2022-02-11 PROCEDURE — 74011000636 HC RX REV CODE- 636: Performed by: SURGERY

## 2022-02-11 PROCEDURE — 83735 ASSAY OF MAGNESIUM: CPT

## 2022-02-11 PROCEDURE — 94760 N-INVAS EAR/PLS OXIMETRY 1: CPT

## 2022-02-11 PROCEDURE — C1757 CATH, THROMBECTOMY/EMBOLECT: HCPCS | Performed by: SURGERY

## 2022-02-11 PROCEDURE — 74011000250 HC RX REV CODE- 250: Performed by: SURGERY

## 2022-02-11 PROCEDURE — C1769 GUIDE WIRE: HCPCS | Performed by: SURGERY

## 2022-02-11 PROCEDURE — 74011250636 HC RX REV CODE- 250/636: Performed by: ANESTHESIOLOGY

## 2022-02-11 PROCEDURE — 76210000017 HC OR PH I REC 1.5 TO 2 HR: Performed by: SURGERY

## 2022-02-11 PROCEDURE — C1753 CATH, INTRAVAS ULTRASOUND: HCPCS | Performed by: SURGERY

## 2022-02-11 PROCEDURE — 76010000149 HC OR TIME 1 TO 1.5 HR: Performed by: SURGERY

## 2022-02-11 PROCEDURE — 74011000250 HC RX REV CODE- 250: Performed by: NURSE ANESTHETIST, CERTIFIED REGISTERED

## 2022-02-11 PROCEDURE — 2709999900 HC NON-CHARGEABLE SUPPLY: Performed by: SURGERY

## 2022-02-11 PROCEDURE — C1892 INTRO/SHEATH,FIXED,PEEL-AWAY: HCPCS | Performed by: SURGERY

## 2022-02-11 PROCEDURE — 80053 COMPREHEN METABOLIC PANEL: CPT

## 2022-02-11 PROCEDURE — 77030002996 HC SUT SLK J&J -A: Performed by: SURGERY

## 2022-02-11 PROCEDURE — C1887 CATHETER, GUIDING: HCPCS | Performed by: SURGERY

## 2022-02-11 PROCEDURE — 85025 COMPLETE CBC W/AUTO DIFF WBC: CPT

## 2022-02-11 PROCEDURE — 77030031139 HC SUT VCRL2 J&J -A: Performed by: SURGERY

## 2022-02-11 PROCEDURE — 84100 ASSAY OF PHOSPHORUS: CPT

## 2022-02-11 PROCEDURE — 74011250637 HC RX REV CODE- 250/637: Performed by: ANESTHESIOLOGY

## 2022-02-11 RX ORDER — POTASSIUM CHLORIDE 750 MG/1
40 TABLET, FILM COATED, EXTENDED RELEASE ORAL
Status: COMPLETED | OUTPATIENT
Start: 2022-02-11 | End: 2022-02-11

## 2022-02-11 RX ORDER — FENTANYL CITRATE 50 UG/ML
INJECTION, SOLUTION INTRAMUSCULAR; INTRAVENOUS AS NEEDED
Status: DISCONTINUED | OUTPATIENT
Start: 2022-02-11 | End: 2022-02-11 | Stop reason: HOSPADM

## 2022-02-11 RX ORDER — SODIUM CHLORIDE, SODIUM LACTATE, POTASSIUM CHLORIDE, CALCIUM CHLORIDE 600; 310; 30; 20 MG/100ML; MG/100ML; MG/100ML; MG/100ML
100 INJECTION, SOLUTION INTRAVENOUS CONTINUOUS
Status: DISCONTINUED | OUTPATIENT
Start: 2022-02-11 | End: 2022-02-12

## 2022-02-11 RX ORDER — ONDANSETRON 2 MG/ML
4 INJECTION INTRAMUSCULAR; INTRAVENOUS AS NEEDED
Status: DISCONTINUED | OUTPATIENT
Start: 2022-02-11 | End: 2022-02-12

## 2022-02-11 RX ORDER — MORPHINE SULFATE 2 MG/ML
2 INJECTION, SOLUTION INTRAMUSCULAR; INTRAVENOUS
Status: DISCONTINUED | OUTPATIENT
Start: 2022-02-11 | End: 2022-02-12

## 2022-02-11 RX ORDER — MIDAZOLAM HYDROCHLORIDE 1 MG/ML
INJECTION, SOLUTION INTRAMUSCULAR; INTRAVENOUS AS NEEDED
Status: DISCONTINUED | OUTPATIENT
Start: 2022-02-11 | End: 2022-02-11 | Stop reason: HOSPADM

## 2022-02-11 RX ORDER — SODIUM CHLORIDE 9 MG/ML
25 INJECTION, SOLUTION INTRAVENOUS CONTINUOUS
Status: DISCONTINUED | OUTPATIENT
Start: 2022-02-11 | End: 2022-02-11 | Stop reason: HOSPADM

## 2022-02-11 RX ORDER — EPHEDRINE SULFATE/0.9% NACL/PF 50 MG/5 ML
5 SYRINGE (ML) INTRAVENOUS AS NEEDED
Status: DISCONTINUED | OUTPATIENT
Start: 2022-02-11 | End: 2022-02-12 | Stop reason: HOSPADM

## 2022-02-11 RX ORDER — HYDROMORPHONE HYDROCHLORIDE 1 MG/ML
0.2 INJECTION, SOLUTION INTRAMUSCULAR; INTRAVENOUS; SUBCUTANEOUS
Status: ACTIVE | OUTPATIENT
Start: 2022-02-11 | End: 2022-02-11

## 2022-02-11 RX ORDER — DEXMEDETOMIDINE HYDROCHLORIDE 100 UG/ML
INJECTION, SOLUTION INTRAVENOUS AS NEEDED
Status: DISCONTINUED | OUTPATIENT
Start: 2022-02-11 | End: 2022-02-11 | Stop reason: HOSPADM

## 2022-02-11 RX ORDER — DIPHENHYDRAMINE HYDROCHLORIDE 50 MG/ML
12.5 INJECTION, SOLUTION INTRAMUSCULAR; INTRAVENOUS AS NEEDED
Status: ACTIVE | OUTPATIENT
Start: 2022-02-11 | End: 2022-02-11

## 2022-02-11 RX ORDER — FENTANYL CITRATE 50 UG/ML
50 INJECTION, SOLUTION INTRAMUSCULAR; INTRAVENOUS AS NEEDED
Status: DISCONTINUED | OUTPATIENT
Start: 2022-02-11 | End: 2022-02-11 | Stop reason: HOSPADM

## 2022-02-11 RX ORDER — MIDAZOLAM HYDROCHLORIDE 1 MG/ML
1 INJECTION, SOLUTION INTRAMUSCULAR; INTRAVENOUS AS NEEDED
Status: DISCONTINUED | OUTPATIENT
Start: 2022-02-11 | End: 2022-02-11 | Stop reason: HOSPADM

## 2022-02-11 RX ORDER — FENTANYL CITRATE 50 UG/ML
25 INJECTION, SOLUTION INTRAMUSCULAR; INTRAVENOUS
Status: DISCONTINUED | OUTPATIENT
Start: 2022-02-11 | End: 2022-02-12

## 2022-02-11 RX ORDER — MIDAZOLAM HYDROCHLORIDE 1 MG/ML
0.5 INJECTION, SOLUTION INTRAMUSCULAR; INTRAVENOUS
Status: DISCONTINUED | OUTPATIENT
Start: 2022-02-11 | End: 2022-02-12

## 2022-02-11 RX ORDER — SODIUM CHLORIDE, SODIUM LACTATE, POTASSIUM CHLORIDE, CALCIUM CHLORIDE 600; 310; 30; 20 MG/100ML; MG/100ML; MG/100ML; MG/100ML
1000 INJECTION, SOLUTION INTRAVENOUS CONTINUOUS
Status: DISCONTINUED | OUTPATIENT
Start: 2022-02-11 | End: 2022-02-11

## 2022-02-11 RX ORDER — ROPIVACAINE HYDROCHLORIDE 5 MG/ML
150 INJECTION, SOLUTION EPIDURAL; INFILTRATION; PERINEURAL AS NEEDED
Status: DISCONTINUED | OUTPATIENT
Start: 2022-02-11 | End: 2022-02-11 | Stop reason: HOSPADM

## 2022-02-11 RX ORDER — PROPOFOL 10 MG/ML
INJECTION, EMULSION INTRAVENOUS AS NEEDED
Status: DISCONTINUED | OUTPATIENT
Start: 2022-02-11 | End: 2022-02-11 | Stop reason: HOSPADM

## 2022-02-11 RX ORDER — PROPOFOL 10 MG/ML
INJECTION, EMULSION INTRAVENOUS
Status: DISCONTINUED | OUTPATIENT
Start: 2022-02-11 | End: 2022-02-11 | Stop reason: HOSPADM

## 2022-02-11 RX ORDER — OXYCODONE HYDROCHLORIDE 5 MG/1
5 TABLET ORAL AS NEEDED
Status: DISCONTINUED | OUTPATIENT
Start: 2022-02-11 | End: 2022-02-12 | Stop reason: HOSPADM

## 2022-02-11 RX ORDER — LIDOCAINE HYDROCHLORIDE 10 MG/ML
INJECTION INFILTRATION; PERINEURAL AS NEEDED
Status: DISCONTINUED | OUTPATIENT
Start: 2022-02-11 | End: 2022-02-11 | Stop reason: HOSPADM

## 2022-02-11 RX ORDER — LIDOCAINE HYDROCHLORIDE 10 MG/ML
0.1 INJECTION, SOLUTION EPIDURAL; INFILTRATION; INTRACAUDAL; PERINEURAL AS NEEDED
Status: DISCONTINUED | OUTPATIENT
Start: 2022-02-11 | End: 2022-02-11 | Stop reason: HOSPADM

## 2022-02-11 RX ORDER — ACETAMINOPHEN 325 MG/1
650 TABLET ORAL ONCE
Status: COMPLETED | OUTPATIENT
Start: 2022-02-11 | End: 2022-02-11

## 2022-02-11 RX ORDER — SODIUM CHLORIDE 9 MG/ML
25 INJECTION, SOLUTION INTRAVENOUS CONTINUOUS
Status: DISCONTINUED | OUTPATIENT
Start: 2022-02-11 | End: 2022-02-11

## 2022-02-11 RX ADMIN — FENTANYL CITRATE 25 MCG: 50 INJECTION, SOLUTION INTRAMUSCULAR; INTRAVENOUS at 19:36

## 2022-02-11 RX ADMIN — SODIUM CHLORIDE, PRESERVATIVE FREE 10 ML: 5 INJECTION INTRAVENOUS at 22:33

## 2022-02-11 RX ADMIN — MIDAZOLAM 1 MG: 1 INJECTION INTRAMUSCULAR; INTRAVENOUS at 19:25

## 2022-02-11 RX ADMIN — PROPOFOL 50 MCG/KG/MIN: 10 INJECTION, EMULSION INTRAVENOUS at 19:22

## 2022-02-11 RX ADMIN — MIDAZOLAM 1 MG: 1 INJECTION INTRAMUSCULAR; INTRAVENOUS at 19:53

## 2022-02-11 RX ADMIN — ACETAMINOPHEN 650 MG: 325 TABLET ORAL at 17:48

## 2022-02-11 RX ADMIN — SODIUM CHLORIDE, PRESERVATIVE FREE 10 ML: 5 INJECTION INTRAVENOUS at 13:36

## 2022-02-11 RX ADMIN — SODIUM CHLORIDE, POTASSIUM CHLORIDE, SODIUM LACTATE AND CALCIUM CHLORIDE 1000 ML: 600; 310; 30; 20 INJECTION, SOLUTION INTRAVENOUS at 18:00

## 2022-02-11 RX ADMIN — MIDAZOLAM 1 MG: 1 INJECTION INTRAMUSCULAR; INTRAVENOUS at 20:00

## 2022-02-11 RX ADMIN — DEXMEDETOMIDINE HYDROCHLORIDE 4 MCG: 100 INJECTION, SOLUTION, CONCENTRATE INTRAVENOUS at 19:57

## 2022-02-11 RX ADMIN — MIDAZOLAM 1 MG: 1 INJECTION INTRAMUSCULAR; INTRAVENOUS at 19:37

## 2022-02-11 RX ADMIN — POTASSIUM CHLORIDE 40 MEQ: 750 TABLET, FILM COATED, EXTENDED RELEASE ORAL at 10:22

## 2022-02-11 RX ADMIN — MIDAZOLAM 2 MG: 1 INJECTION INTRAMUSCULAR; INTRAVENOUS at 19:20

## 2022-02-11 RX ADMIN — PROPOFOL 50 MG: 10 INJECTION, EMULSION INTRAVENOUS at 19:26

## 2022-02-11 RX ADMIN — FENTANYL CITRATE 25 MCG: 50 INJECTION, SOLUTION INTRAMUSCULAR; INTRAVENOUS at 19:31

## 2022-02-11 RX ADMIN — HEPARIN SODIUM 15 UNITS/KG/HR: 1000 INJECTION INTRAVENOUS; SUBCUTANEOUS at 15:38

## 2022-02-11 RX ADMIN — FENTANYL CITRATE 25 MCG: 50 INJECTION, SOLUTION INTRAMUSCULAR; INTRAVENOUS at 19:40

## 2022-02-11 RX ADMIN — IOHEXOL 50 ML: 240 INJECTION, SOLUTION INTRATHECAL; INTRAVASCULAR; INTRAVENOUS; ORAL at 08:19

## 2022-02-11 RX ADMIN — FENTANYL CITRATE 25 MCG: 50 INJECTION, SOLUTION INTRAMUSCULAR; INTRAVENOUS at 19:53

## 2022-02-11 RX ADMIN — IOPAMIDOL 100 ML: 755 INJECTION, SOLUTION INTRAVENOUS at 08:19

## 2022-02-11 RX ADMIN — MIDAZOLAM 1 MG: 1 INJECTION INTRAMUSCULAR; INTRAVENOUS at 19:31

## 2022-02-11 NOTE — PROGRESS NOTES
0747-Pt off floor at CT  0815- Pt back in room  0950- Call from CT. CRITICAL RESULT ON CT SCAN  patient was found to have Right Lower Lobe PE. Not Occlusive. Message send to MD to notify of result.

## 2022-02-11 NOTE — PROGRESS NOTES
Duplex reviewed and spoke with ED attending. Patient has worsening symptoms and duplex suggesting clot propagation despite appropriate AC. --Admit   --Hep gtt  --NPO @ [facility]  --If symptoms do not improve on heparin patient will likely benefit from lysis.  I will plan to do this tomorrow depending on his exam in the AM  --Will order covid test and T&S in expectation that he might need intervention

## 2022-02-11 NOTE — H&P
[facility]  HISTORY AND PHYSICAL    Name:  Chadwick High  MR#:  [id number]  :  1970  ACCOUNT #:  [de-identified]  ADMIT DATE:  02/10/2022      The patient was seen, evaluated and admitted by me on 02/10/2022. PRIMARY CARE PHYSICIAN:  Negra Duncan DO    SOURCE OF INFORMATION:  Patient and review of ED electronic medical records. CHIEF COMPLAINT:  Pain and swelling, left lower extremity. HISTORY OF PRESENT ILLNESS:  This is a 73-year-old man with no significant past medical history, was in his usual state of health until several months ago when the patient developed pain and swelling of the left lower extremity. Initially, the swelling was intermittent. For the past several weeks, the swelling is constant, associated with pain. The pain is also constant, described as dull ache with no known aggravating or relieving factors, 6/10 in severity. The patient was initially seen at the emergency room on 2022 and was diagnosed with DVT of the left lower extremity. He was discharged home on Eliquis. The patient has been on Eliquis for the past 17 days. No significant improvement in the left lower extremity swelling. The pain is also not improving. .  The patient has been seen by his primary care physician. He has an outpatient appointment with the hematologist this coming Thursday. Because of worsening swelling and pain, the patient was advised to come to the emergency room for reevaluation. When the patient arrived at the emergency room, ultrasound of the left lower extremity shows worsening of the DVT. The emergency room provider consulted interventional radiologist for evaluation for thrombectomy. Radiologist advised starting the patient on heparin and further recommendation to follow. The patient was started on heparin. He was referred to the hospitalist service for evaluation for admission. No prior history of thromboembolism.   No family history of thromboembolism. PAST MEDICAL HISTORY:  Not significant. ALLERGIES:  NO KNOWN DRUG ALLERGIES. MEDICATIONS:  1.  Eliquis 5 mg twice daily. 2.  Tylenol 325 mg one tablet every 4 hours as needed for pain. FAMILY HISTORY:  This was reviewed. His mother  of brain aneurysm. His father  of heart attack at the age of 77. PAST SURGICAL HISTORY:  This is significant for bilateral ankle reconstruction due to sports injury and shoulder arthroscopy. SOCIAL HISTORY:  No history of alcohol or tobacco abuse. REVIEW OF SYSTEMS:  HEAD, EYES, EARS, NOSE AND THROAT:  No headache. No dizziness, no blurring of vision, no photophobia. RESPIRATORY SYSTEM:  No cough, no shortness of breath, no hemoptysis. CARDIOVASCULAR SYSTEM:  No chest pain, no orthopnea, no palpitation. GASTROINTESTINAL SYSTEM:  No nausea or vomiting. No diarrhea. No constipation. GENITOURINARY SYSTEM:  No dysuria, no urgency and no frequency. All other systems are reviewed and they are negative. PHYSICAL EXAMINATION:  GENERAL APPEARANCE:  The patient appeared ill, in moderate distress. VITAL SIGNS:  On arrival at the emergency room, temperature 98.2, pulse 80, respiratory rate 18, blood pressure 144/93, oxygen saturation 99% on room air. HEENT:  Head:  Normocephalic, atraumatic. Eyes:  Normal eye movement. No redness, no drainage, no discharge. Ears:  Normal external ears with no evidence of drainage. Nose:  No deformity, no drainage. Mouth and Throat:  No visible oral lesion. NECK:  Neck is supple. No JVD, no thyromegaly. CHEST:  Clear breath sounds. No wheezing, no crackles. HEART:  Normal S1 and S2, regular. No clinically appreciable murmur. ABDOMEN:  Soft, nontender. Normal bowel sounds. CNS:  Alert and oriented x3. No gross focal neurological deficit. EXTREMITIES:  Swelling of the left lower extremity noted. Pulses 2+ bilaterally.   MUSCULOSKELETAL SYSTEM:  No evidence of joint deformity or swelling. SKIN:  Swelling and tenderness of the left lower extremity is noted and present on admission. PSYCHIATRY:  Normal mood and affect. LYMPHATIC SYSTEM:  No cervical lymphadenopathy. DIAGNOSTIC DATA:  Ultrasound of the left lower extremity shows acute occlusive thrombus present in the left mid and distal femoral vein, popliteal vein, gastrocnemius vein, and on one or two posterior tibia veins. LABORATORY DATA:  Hematology, WBC 6.1, hemoglobin 15.5, hematocrit 47.2, platelets of 463. Coagulation profile, PTT 28.2. COVID-19 rapid test not detected. Chemistry, sodium 135, potassium 3.7, chloride 103, CO2 29, glucose 90, creatinine 0.86, calcium 9.5. Total bilirubin 0.7, ALT 26, AST 15, alkaline phosphatase 71, total protein and 8.4, albumin level 4.4, globulin at 4.0.    ASSESSMENT:  1. Acute deep vein thrombosis, left lower extremity. 2.  Elevated blood pressure. PLAN:  1. Acute deep vein thrombosis, left lower extremity. The patient was initially diagnosed with acute DVT of the left lower extremity about 2 weeks ago. The patient has been on Eliquis since then. No significant improvement in terms of the swelling and pain in the left lower extremity. Repeat ultrasound today shows increased clot burden. We will continue with heparin started in the emergency room. Consultation has been made with the interventional radiologist for evaluation for thrombectomy. We will await further recommendation from the interventional radiologist.  We will obtain CTA of the chest to evaluate the patient for pulmonary embolism. We will also obtain a CT scan of the abdomen and pelvis to evaluate the patient for mass lesion. Since the patient was awaiting outpatient evaluation by the hematologist, that was requested by primary care physician. Because of that Hematology consult will be requested to assist in further evaluation and treatment of acute DVT that failed Eliquis. 2.  Elevated blood pressure. The patient has no history of hypertension. We will check TSH level. We will obtain an EKG. We will monitor the patient's blood pressure closely. If the blood pressure reading remains elevated, the patient may require antihypertensive medication at the time of discharge to home. 3.  Other issues, code status: The patient is a full code. The patient is on full-dose heparin. Because of that, there is no need for DVT prophylaxis. FUNCTIONAL STATUS PRIOR TO ADMISSION:  The patient came from home. The patient is ambulatory with no assistive device. COVID PRECAUTION:  The patient was wearing a face mask. I was wearing a face mask and gloves for this patient's encounter. The patient tested negative for COVID-19 virus infection in the emergency room.         Christine Miller MD      [id number]  D:  02/10/2022 21:34  T:  02/10/2022 22:21  JOB #:  [id number]  CC:  Richard Ch DO

## 2022-02-11 NOTE — ROUTINE PROCESS
TRANSFER - OUT REPORT:    Verbal report given to Avinash Lynne RN(name) on [facility]  being transferred to [facility](unit) for ordered procedure       Report consisted of patients Situation, Background, Assessment and   Recommendations(SBAR). Information from the following report(s) SBAR, Kardex, ED Summary, Recent Results and Cardiac Rhythm NSR was reviewed with the receiving nurse. Lines:   Peripheral IV 02/10/22 Right Antecubital (Active)   Site Assessment Clean, dry, & intact 02/11/22 1100   Phlebitis Assessment 0 02/11/22 1100   Infiltration Assessment 0 02/11/22 1100   Dressing Status Clean, dry, & intact 02/11/22 1100   Dressing Type Transparent 02/11/22 1100   Hub Color/Line Status Pink 02/11/22 1100   Action Taken Open ports on tubing capped 02/11/22 1100   Alcohol Cap Used Yes 02/11/22 1100       Peripheral IV 02/10/22 Left Hand (Active)   Site Assessment Clean, dry, & intact 02/11/22 1100   Phlebitis Assessment 0 02/11/22 1100   Infiltration Assessment 0 02/11/22 1100   Dressing Status Clean, dry, & intact 02/11/22 1100   Dressing Type Transparent 02/11/22 0418   Hub Color/Line Status Pink 02/11/22 0418   Action Taken Open ports on tubing capped 02/11/22 0418   Alcohol Cap Used Yes 02/11/22 0418        Opportunity for questions and clarification was provided.       Patient transported with:   [employer]

## 2022-02-11 NOTE — ANESTHESIA PREPROCEDURE EVALUATION
Relevant Problems   No relevant active problems       Anesthetic History   No history of anesthetic complications            Review of Systems / Medical History  Patient summary reviewed, nursing notes reviewed and pertinent labs reviewed    Pulmonary          Undiagnosed apnea      Comments: Subsegmental PE   Neuro/Psych   Within defined limits           Cardiovascular  Within defined limits                Exercise tolerance: >4 METS  Comments: LE DVT no clear etiology   GI/Hepatic/Renal  Within defined limits              Endo/Other  Within defined limits           Other Findings            Physical Exam    Airway  Mallampati: II  TM Distance: > 6 cm  Neck ROM: normal range of motion   Mouth opening: Normal     Cardiovascular  Regular rate and rhythm,  S1 and S2 normal,  no murmur, click, rub, or gallop             Dental  No notable dental hx       Pulmonary  Breath sounds clear to auscultation               Abdominal  GI exam deferred       Other Findings            Anesthetic Plan    ASA: 2  Anesthesia type: MAC          Induction: Intravenous  Anesthetic plan and risks discussed with: Patient

## 2022-02-11 NOTE — CONSULTS
Consult    Patient: Vira Pandya MRN: [id number]  SSN: [id number]    YOB: 1970  Age: 54 y.o. Sex: male      Subjective:      Vira Pandya is a 54 y.o. male who is being seen for LLE DVT. He was previously seen 3 wk ago found to have a LLE DVT. No clear inciting event. No recent illness, no covid, no trauma. He did do a small amount of traveling but flight time was limited to about 2 hours. He was maintained on eliquis. He now returns with significantly worsened symptoms. Found by duplex to have propagation of the clot now with occlusive thrombus throughout the majority of his leg. History reviewed. No pertinent past medical history. Past Surgical History:   Procedure Laterality Date    HX ORTHOPAEDIC      HX SHOULDER ARTHROSCOPY        History reviewed. No pertinent family history.   Social History     Tobacco Use    Smoking status: Never Smoker    Smokeless tobacco: Never Used   Substance Use Topics    Alcohol use: Yes     Comment: socially      Current Facility-Administered Medications   Medication Dose Route Frequency Provider Last Rate Last Admin    potassium chloride SR (KLOR-CON 10) tablet 40 mEq  40 mEq Oral NOW Richy Angeles MD        heparin 25,000 units in  ml infusion  14-36 Units/kg/hr IntraVENous TITRATE Mile Morgan MD 15.2 mL/hr at 02/11/22 0407 15 Units/kg/hr at 02/11/22 0407    morphine injection 2 mg  2 mg IntraVENous Q4H PRN Mile Morgan MD        sodium chloride (NS) flush 5-40 mL  5-40 mL IntraVENous Q8H Mile Morgan MD        sodium chloride (NS) flush 5-40 mL  5-40 mL IntraVENous PRN Mile Morgan MD        acetaminophen (TYLENOL) tablet 650 mg  650 mg Oral Q6H PRN Mile Morgan MD        Or    acetaminophen (TYLENOL) suppository 650 mg  650 mg Rectal Q6H PRN Mile Morgan MD        polyethylene glycol (MIRALAX) packet 17 g  17 g Oral DAILY PRN Mile Morgan MD        ondansetron (ZOFRAN ODT) tablet 4 mg  4 mg Oral Q8H PRN Belinda CMDERMOTT MD        Or    ondansetron (ZOFRAN) injection 4 mg  4 mg IntraVENous Q6H PRN Mile Morgan MD            No Known Allergies    Review of Systems:  A comprehensive review of systems was negative except for that written in the History of Present Illness. Objective:     Vitals:    02/11/22 0400 02/11/22 0418 02/11/22 0600 02/11/22 0824   BP:  107/69  120/73   Pulse: 68 75 75 65   Resp:  16  16   Temp:  97.8 °F (36.6 °C)  97.8 °F (36.6 °C)   SpO2:  95%  97%   Weight:       Height:            Physical Exam:  General: Patient is pleasant and cooperative and appears to be in no acute distress. HEENT: Normocephalic atraumatic. Appropriate tracking. No scleral icterus. Nares patent. Trachea is midline. Chest: Unlabored respirations. Symmetrical chest expansion. Cardiac: Regular rate and rhythm. Acyanotic  Abdomen: Abdomen is soft, nontender, nondistended. Extremities: Moves all extremities. Vascular: LLE swelling. Not tense. Warn well perfused with palp pulses. Sig leg size discrepancy L to R. Assessment:     Hospital Problems  Date Reviewed: 2/10/2022          Codes Class Noted POA    * (Principal) Acute deep vein thrombosis (DVT) of left lower extremity (HCC) ICD-10-CM: I82.402  ICD-9-CM: 453.40  2/10/2022 Yes              Plan:     LLE DVT -- Worsening symptoms and increased thrombus burden. Given his young age and symptomatic nature will plan on suction thrombectomy and lysis. Suspect this will be a two day process as some of the thrombus dates back 3 weeks.      --Remain NPO  --Consent  --LLE venogram, lysis, mechanical suction thrombectomy today with relook tomorrow and stenting as needed    Signed By: Robi Segundo MD     February 11, 2022

## 2022-02-11 NOTE — PROGRESS NOTES
HONG: anticipate d/c home with family assistance; Follow up with PCP & Specialist; Spouse transport    RUR: 7%    -Vascular surgery following  -Plan for venogram, lysis and thrombectomy and lysis today 2/11    Reason for Admission:    LLE DVT                    RUR Score:                   7%  Plan for utilizing home health:      No HH needs at this time    PCP: First and Last name:  Alfred Webster DO     Name of Practice:    Are you a current patient: Yes/No: yes   Approximate date of last visit: within last year   Can you participate in a virtual visit with your PCP:                     Current Advanced Directive/Advance Care Plan: Full Code      Healthcare Decision Maker:   Click here to complete [address] including selection of the [address] Relationship (ie \"Primary\")         Uziel Malcom, [phone]                    Transition of Care Plan:                    1030-CM reviewed pt chart & met with pt at bedside to discuss transitional planning. Pt resides with spouse and stated he works and is independent with [address] [facility], denies dme. Pt uses [facility] pharmacy for meds with no copay concerns. Cm confirmed pcp & demographics. Spouse to provide d/c transport. Pt denies prior hh, however reported prior OP PT about 3 years ago for a shoulder injury. Cm to follow. Nirmal Monroy RN BSN CCM  Care Management Interventions  PCP Verified by CM: Yes  Palliative Care Criteria Met (RRAT>21 & CHF Dx)?: No  Mode of Transport at Discharge:  Other (see comment) (SPOUSE)  Transition of Care Consult (CM Consult): Discharge Planning  MyChart Signup: No (PT DENIES DME )  Discharge Durable Medical Equipment: No  Health Maintenance Reviewed: Yes  Physical Therapy Consult: No  Occupational Therapy Consult: No  Speech Therapy Consult: No  Support Systems: Spouse/Significant Other  Confirm Follow Up Transport: Family  Discharge Location  Patient Expects to be Discharged to[de-identified] Home with family assistance

## 2022-02-11 NOTE — PROGRESS NOTES
[facility]  Hospitalist Group                                                                                          Hospitalist Progress Note  Sharon Raphael MD  Answering service: [phone] from in house phone        Date of Service:  2022  NAME:  Nancy Weiss  :  1970  MRN:  [id number]      Admission Summary:   HPI: \"This is a 51-year-old man with no significant past medical history, was in his usual state of health until several months ago when the patient developed pain and swelling of the left lower extremity. Initially, the swelling was intermittent. For the past several weeks, the swelling is constant, associated with pain. The pain is also constant, described as dull ache with no known aggravating or relieving factors, 6/10 in severity. The patient was initially seen at the emergency room on 2022 and was diagnosed with DVT of the left lower extremity. He was discharged home on Eliquis. The patient has been on Eliquis for the past 17 days. No significant improvement in the left lower extremity swelling. The pain is also not improving. .  The patient has been seen by his primary care physician. He has an outpatient appointment with the hematologist this coming Thursday. Because of worsening swelling and pain, the patient was advised to come to the emergency room for reevaluation. When the patient arrived at the emergency room, ultrasound of the left lower extremity shows worsening of the DVT. The emergency room provider consulted interventional radiologist for evaluation for thrombectomy. Radiologist advised starting the patient on heparin and further recommendation to follow. The patient was started on heparin. He was referred to the hospitalist service for evaluation for admission. No prior history of thromboembolism. No family history of thromboembolism. \"    Interval history / Subjective:   Patient seen examined earlier bedside. Denies any chest pain or shortness of breath. Plan for suction thrombectomy this afternoon plus minus stenting tomorrow     Assessment & Plan:     Acute deep vein thrombosis, left lower extremity  Subsegmental PE  -Appears unprovoked no history of clotting disorder in the family  -Patient had heme-onc follow-up outpatient plan for 217 for further work-up can f/u there op  -Continue heparin GTT  -TTE ordered   -Vascular following, appreciate recs plan for suction thrombectomy 2/11 +/- possbile stenting 2/12     HypoK   -replete PRN   -f/u bmps      Elevated blood pressure  -possibly hospital-acquired we will continue to monitor add as needed blood pressure meds         Code status: full  DVT prophylaxis: Heparin GTT    Care Plan discussed with: Patient/Family, Nurse and   Anticipated Disposition: Home w/Family  Anticipated Discharge: Greater than 48 hours     Hospital Problems  Date Reviewed: 2/10/2022          Codes Class Noted POA    * (Principal) Acute deep vein thrombosis (DVT) of left lower extremity ([address] ICD-10-CM: Y58.360  ICD-9-CM: 453.40  2/10/2022 Yes                Review of Systems:   A comprehensive review of systems was negative except for that written in the HPI. Vital Signs:    Last 24hrs VS reviewed since prior progress note. Most recent are:  Visit Vitals  /73 (BP 1 Location: Left upper arm, BP Patient Position: At rest)   Pulse 70   Temp 97.8 °F (36.6 °C)   Resp 16   Ht 6' 2\" (1.88 m)   Wt 101.6 kg (224 lb)   SpO2 95%   BMI 28.76 kg/m²       No intake or output data in the 24 hours ending 02/11/22 1525     Physical Examination:     I had a face to face encounter with this patient and independently examined them on 2/11/2022 as outlined below:          Constitutional:  No acute distress, cooperative, pleasant    ENT:  Oral mucosa moist, oropharynx benign. Resp:  CTA bilaterally. No wheezing/rhonchi/rales.  No accessory muscle use   CV:  Regular rhythm, normal rate, no murmurs, gallops, rubs    GI:  Soft, non distended, non tender. normoactive bowel sounds, no hepatosplenomegaly     Musculoskeletal:  No edema, warm, 2+ pulses throughout    Neurologic:  Moves all extremities. AAOx3, CN II-XII reviewed            Data Review:    Review and/or order of clinical lab test  Review and/or order of tests in the radiology section of [facility]  Review and/or order of tests in the medicine section of [facility]      Labs:     Recent Labs     02/11/22  0319 02/10/22  1959   WBC 5.1 6.1   HGB 13.5 15.5   HCT 40.4 47.2    248     Recent Labs     02/11/22  0319 02/10/22  1959    135*   K 3.4* 3.7    103   CO2 25 29   BUN 10 12   CREA 0.70 0.86    90   CA 8.6 9.5   MG 2.0  --    PHOS 3.6  --      Recent Labs     02/11/22  0319 02/10/22  1959   ALT 19 26   AP 53 71   TBILI 0.7 0.7   TP 6.1* 8.4*   ALB 3.3* 4.4   GLOB 2.8 4.0     Recent Labs     02/11/22  1039 02/11/22  0320 02/10/22  2043   APTT 66.6* 56.9* 28.4      No results for input(s): FE, TIBC, PSAT, FERR in the last 72 hours. No results found for: FOL, RBCF   No results for input(s): PH, PCO2, PO2 in the last 72 hours. No results for input(s): CPK, CKNDX, TROIQ in the last 72 hours.     No lab exists for component: CPKMB  Lab Results   Component Value Date/Time    Cholesterol, total 183 02/11/2022 03:19 AM    HDL Cholesterol 85 02/11/2022 03:19 AM    LDL, calculated 82.6 02/11/2022 03:19 AM    Triglyceride 77 02/11/2022 03:19 AM    CHOL/HDL Ratio 2.2 02/11/2022 03:19 AM     No results found for: GLUCPOC  No results found for: COLOR, APPRN, SPGRU, REFSG, SHERON, PROTU, GLUCU, KETU, BILU, UROU, AME, LEUKU, GLUKE, EPSU, BACTU, WBCU, RBCU, CASTS, UCRY      Medications Reviewed:     Current Facility-Administered Medications   Medication Dose Route Frequency    heparin 25,000 units in  ml infusion  14-36 Units/kg/hr IntraVENous TITRATE    morphine injection 2 mg  2 mg IntraVENous Q4H PRN    sodium chloride (NS) flush 5-40 mL  5-40 mL IntraVENous Q8H    sodium chloride (NS) flush 5-40 mL  5-40 mL IntraVENous PRN    acetaminophen (TYLENOL) tablet 650 mg  650 mg Oral Q6H PRN    Or    acetaminophen (TYLENOL) suppository 650 mg  650 mg Rectal Q6H PRN    polyethylene glycol (MIRALAX) packet 17 g  17 g Oral DAILY PRN    ondansetron (ZOFRAN ODT) tablet 4 mg  4 mg Oral Q8H PRN    Or    ondansetron (ZOFRAN) injection 4 mg  4 mg IntraVENous Q6H PRN     ______________________________________________________________________  EXPECTED LENGTH OF STAY: 3d 21h  ACTUAL LENGTH OF STAY:          1                 Nelia Cam MD

## 2022-02-11 NOTE — ROUTINE PROCESS
TRANSFER - OUT REPORT:    Verbal report given to unit RN (name) on Nancy Weiss  being transferred to [facility](unit) for routine progression of care       Report consisted of patients Situation, Background, Assessment and   Recommendations(SBAR). Information from the following report(s) SBAR was reviewed with the receiving nurse. Lines:   Peripheral IV 02/10/22 Right Antecubital (Active)   Site Assessment Clean, dry, & intact 02/10/22 2002   Phlebitis Assessment 0 02/10/22 2002   Infiltration Assessment 0 02/10/22 2002   Dressing Status Clean, dry, & intact 02/10/22 2002   Hub Color/Line Status Pink 02/10/22 2002   Action Taken Blood drawn 02/10/22 2002       Peripheral IV 02/10/22 Left Hand (Active)   Site Assessment Clean, dry, & intact 02/10/22 2150        Opportunity for questions and clarification was provided.       Patient transported with:   Registered Nurse  Tech

## 2022-02-12 ENCOUNTER — ANESTHESIA (OUTPATIENT)
Dept: CARDIOTHORACIC SURGERY | Age: 52
DRG: 270 | End: 2022-02-12
Payer: COMMERCIAL

## 2022-02-12 ENCOUNTER — ANESTHESIA EVENT (OUTPATIENT)
Dept: CARDIOTHORACIC SURGERY | Age: 52
DRG: 270 | End: 2022-02-12
Payer: COMMERCIAL

## 2022-02-12 ENCOUNTER — APPOINTMENT (OUTPATIENT)
Dept: GENERAL RADIOLOGY | Age: 52
DRG: 270 | End: 2022-02-12
Attending: SURGERY
Payer: COMMERCIAL

## 2022-02-12 ENCOUNTER — APPOINTMENT (OUTPATIENT)
Dept: NON INVASIVE DIAGNOSTICS | Age: 52
DRG: 270 | End: 2022-02-12
Attending: STUDENT IN AN ORGANIZED HEALTH CARE EDUCATION/TRAINING PROGRAM
Payer: COMMERCIAL

## 2022-02-12 VITALS
HEIGHT: 74 IN | DIASTOLIC BLOOD PRESSURE: 68 MMHG | RESPIRATION RATE: 14 BRPM | TEMPERATURE: 97.6 F | WEIGHT: 212 LBS | BODY MASS INDEX: 27.21 KG/M2 | SYSTOLIC BLOOD PRESSURE: 99 MMHG | OXYGEN SATURATION: 95 % | HEART RATE: 71 BPM

## 2022-02-12 LAB
ANION GAP SERPL CALC-SCNC: 1 MMOL/L (ref 5–15)
BASOPHILS # BLD: 0 K/UL (ref 0–0.1)
BASOPHILS NFR BLD: 1 % (ref 0–1)
BUN SERPL-MCNC: 11 MG/DL (ref 6–20)
BUN/CREAT SERPL: 17 (ref 12–20)
CALCIUM SERPL-MCNC: 8.6 MG/DL (ref 8.5–10.1)
CHLORIDE SERPL-SCNC: 108 MMOL/L (ref 97–108)
CO2 SERPL-SCNC: 28 MMOL/L (ref 21–32)
CREAT SERPL-MCNC: 0.63 MG/DL (ref 0.7–1.3)
DIFFERENTIAL METHOD BLD: NORMAL
ECHO AO ROOT DIAM: 3 CM
ECHO AO ROOT INDEX: 1.35 CM/M2
ECHO AV AREA PEAK VELOCITY: 2 CM2
ECHO AV AREA PEAK VELOCITY: 2 CM2
ECHO AV PEAK GRADIENT: 8 MMHG
ECHO AV PEAK VELOCITY: 1.5 M/S
ECHO AV VELOCITY RATIO: 0.6
ECHO LA DIAMETER INDEX: 1.52 CM/M2
ECHO LA DIAMETER: 3.4 CM
ECHO LA TO AORTIC ROOT RATIO: 1.13
ECHO LA VOL 4C: 39 ML (ref 18–58)
ECHO LA VOLUME INDEX A4C: 17 ML/M2 (ref 16–34)
ECHO LV E' LATERAL VELOCITY: 14 CM/S
ECHO LV E' SEPTAL VELOCITY: 12 CM/S
ECHO LV FRACTIONAL SHORTENING: 27 % (ref 28–44)
ECHO LV INTERNAL DIMENSION DIASTOLE INDEX: 1.97 CM/M2
ECHO LV INTERNAL DIMENSION DIASTOLIC: 4.4 CM (ref 4.2–5.9)
ECHO LV INTERNAL DIMENSION SYSTOLIC INDEX: 1.43 CM/M2
ECHO LV INTERNAL DIMENSION SYSTOLIC: 3.2 CM
ECHO LV IVSD: 0.8 CM (ref 0.6–1)
ECHO LV MASS 2D: 128 G (ref 88–224)
ECHO LV MASS INDEX 2D: 57.4 G/M2 (ref 49–115)
ECHO LV POSTERIOR WALL DIASTOLIC: 1 CM (ref 0.6–1)
ECHO LV RELATIVE WALL THICKNESS RATIO: 0.45
ECHO LVOT AREA: 3.1 CM2
ECHO LVOT DIAM: 2 CM
ECHO LVOT MEAN GRADIENT: 2 MMHG
ECHO LVOT PEAK GRADIENT: 3 MMHG
ECHO LVOT PEAK VELOCITY: 0.9 M/S
ECHO LVOT STROKE VOLUME INDEX: 32 ML/M2
ECHO LVOT SV: 71.3 ML
ECHO LVOT VTI: 22.7 CM
ECHO MV A VELOCITY: 0.66 M/S
ECHO MV AREA PHT: 4.7 CM2
ECHO MV E DECELERATION TIME (DT): 162.4 MS
ECHO MV E VELOCITY: 0.73 M/S
ECHO MV E/A RATIO: 1.11
ECHO MV E/E' LATERAL: 5.21
ECHO MV E/E' RATIO (AVERAGED): 5.65
ECHO MV E/E' SEPTAL: 6.08
ECHO MV PRESSURE HALF TIME (PHT): 47.1 MS
ECHO MV REGURGITANT PEAK GRADIENT: 31 MMHG
ECHO MV REGURGITANT PEAK VELOCITY: 2.8 M/S
ECHO PV MAX VELOCITY: 0.6 M/S
ECHO PV PEAK GRADIENT: 2 MMHG
ECHO RV FREE WALL PEAK S': 13 CM/S
ECHO RV TAPSE: 2.1 CM (ref 1.5–2)
EOSINOPHIL # BLD: 0.1 K/UL (ref 0–0.4)
EOSINOPHIL NFR BLD: 3 % (ref 0–7)
ERYTHROCYTE [DISTWIDTH] IN BLOOD BY AUTOMATED COUNT: 11.9 % (ref 11.5–14.5)
FIBRINOGEN PPP-MCNC: 261 MG/DL (ref 200–475)
FIBRINOGEN PPP-MCNC: 261 MG/DL (ref 200–475)
GLUCOSE SERPL-MCNC: 100 MG/DL (ref 65–100)
HCT VFR BLD AUTO: 41.2 % (ref 36.6–50.3)
HGB BLD-MCNC: 13.3 G/DL (ref 12.1–17)
IMM GRANULOCYTES # BLD AUTO: 0 K/UL (ref 0–0.04)
IMM GRANULOCYTES NFR BLD AUTO: 0 % (ref 0–0.5)
LYMPHOCYTES # BLD: 1.3 K/UL (ref 0.8–3.5)
LYMPHOCYTES NFR BLD: 32 % (ref 12–49)
MCH RBC QN AUTO: 30.8 PG (ref 26–34)
MCHC RBC AUTO-ENTMCNC: 32.3 G/DL (ref 30–36.5)
MCV RBC AUTO: 95.4 FL (ref 80–99)
MONOCYTES # BLD: 0.4 K/UL (ref 0–1)
MONOCYTES NFR BLD: 10 % (ref 5–13)
NEUTS SEG # BLD: 2.2 K/UL (ref 1.8–8)
NEUTS SEG NFR BLD: 54 % (ref 32–75)
NRBC # BLD: 0 K/UL (ref 0–0.01)
NRBC BLD-RTO: 0 PER 100 WBC
PLATELET # BLD AUTO: 174 K/UL (ref 150–400)
PMV BLD AUTO: 10 FL (ref 8.9–12.9)
POTASSIUM SERPL-SCNC: 3.7 MMOL/L (ref 3.5–5.1)
RBC # BLD AUTO: 4.32 M/UL (ref 4.1–5.7)
SODIUM SERPL-SCNC: 137 MMOL/L (ref 136–145)
WBC # BLD AUTO: 4.1 K/UL (ref 4.1–11.1)

## 2022-02-12 PROCEDURE — C1725 CATH, TRANSLUMIN NON-LASER: HCPCS | Performed by: SURGERY

## 2022-02-12 PROCEDURE — 06H03DZ INSERTION OF INTRALUMINAL DEVICE INTO INFERIOR VENA CAVA, PERCUTANEOUS APPROACH: ICD-10-PCS | Performed by: SURGERY

## 2022-02-12 PROCEDURE — 77030037392 HC CANN PUMP/FLTR INDIGO PENU -E: Performed by: SURGERY

## 2022-02-12 PROCEDURE — 06CN3ZZ EXTIRPATION OF MATTER FROM LEFT FEMORAL VEIN, PERCUTANEOUS APPROACH: ICD-10-PCS | Performed by: SURGERY

## 2022-02-12 PROCEDURE — 74011000250 HC RX REV CODE- 250: Performed by: NURSE ANESTHETIST, CERTIFIED REGISTERED

## 2022-02-12 PROCEDURE — B51C1ZZ FLUOROSCOPY OF LEFT LOWER EXTREMITY VEINS USING LOW OSMOLAR CONTRAST: ICD-10-PCS | Performed by: SURGERY

## 2022-02-12 PROCEDURE — 74011250637 HC RX REV CODE- 250/637: Performed by: INTERNAL MEDICINE

## 2022-02-12 PROCEDURE — 80048 BASIC METABOLIC PNL TOTAL CA: CPT

## 2022-02-12 PROCEDURE — 74011250636 HC RX REV CODE- 250/636: Performed by: NURSE ANESTHETIST, CERTIFIED REGISTERED

## 2022-02-12 PROCEDURE — 74011000250 HC RX REV CODE- 250: Performed by: INTERNAL MEDICINE

## 2022-02-12 PROCEDURE — 77030002888 HC SUT CHRMC J&J -A: Performed by: SURGERY

## 2022-02-12 PROCEDURE — C1753 CATH, INTRAVAS ULTRASOUND: HCPCS | Performed by: SURGERY

## 2022-02-12 PROCEDURE — 85384 FIBRINOGEN ACTIVITY: CPT

## 2022-02-12 PROCEDURE — C1876 STENT, NON-COA/NON-COV W/DEL: HCPCS | Performed by: SURGERY

## 2022-02-12 PROCEDURE — 74011000250 HC RX REV CODE- 250: Performed by: ANESTHESIOLOGY

## 2022-02-12 PROCEDURE — 36415 COLL VENOUS BLD VENIPUNCTURE: CPT

## 2022-02-12 PROCEDURE — 74011250636 HC RX REV CODE- 250/636: Performed by: SURGERY

## 2022-02-12 PROCEDURE — 77030026438 HC STYL ET INTUB CARD -A: Performed by: ANESTHESIOLOGY

## 2022-02-12 PROCEDURE — 02163Z7 BYPASS RIGHT ATRIUM TO LEFT ATRIUM, PERCUTANEOUS APPROACH: ICD-10-PCS | Performed by: SURGERY

## 2022-02-12 PROCEDURE — 77030013079 HC BLNKT BAIR HGGR 3M -A: Performed by: ANESTHESIOLOGY

## 2022-02-12 PROCEDURE — 77030013060 HC DEV INFL PRSS MRTM -B: Performed by: SURGERY

## 2022-02-12 PROCEDURE — 06CD3ZZ EXTIRPATION OF MATTER FROM LEFT COMMON ILIAC VEIN, PERCUTANEOUS APPROACH: ICD-10-PCS | Performed by: SURGERY

## 2022-02-12 PROCEDURE — 85025 COMPLETE CBC W/AUTO DIFF WBC: CPT

## 2022-02-12 PROCEDURE — 77030008462 HC STPLR SKN PROX J&J -A: Performed by: SURGERY

## 2022-02-12 PROCEDURE — 77030008684 HC TU ET CUF COVD -B: Performed by: ANESTHESIOLOGY

## 2022-02-12 PROCEDURE — 74011000636 HC RX REV CODE- 636: Performed by: SURGERY

## 2022-02-12 PROCEDURE — C1757 CATH, THROMBECTOMY/EMBOLECT: HCPCS | Performed by: SURGERY

## 2022-02-12 PROCEDURE — 2709999900 HC NON-CHARGEABLE SUPPLY: Performed by: SURGERY

## 2022-02-12 PROCEDURE — 76210000063 HC OR PH I REC FIRST 0.5 HR: Performed by: SURGERY

## 2022-02-12 PROCEDURE — 76010000153 HC OR TIME 1.5 TO 2 HR: Performed by: SURGERY

## 2022-02-12 PROCEDURE — 77030014008 HC SPNG HEMSTAT J&J -C: Performed by: SURGERY

## 2022-02-12 PROCEDURE — C1894 INTRO/SHEATH, NON-LASER: HCPCS | Performed by: SURGERY

## 2022-02-12 PROCEDURE — 74011250636 HC RX REV CODE- 250/636: Performed by: NURSE PRACTITIONER

## 2022-02-12 PROCEDURE — C1769 GUIDE WIRE: HCPCS | Performed by: SURGERY

## 2022-02-12 PROCEDURE — 76060000035 HC ANESTHESIA 2 TO 2.5 HR: Performed by: SURGERY

## 2022-02-12 PROCEDURE — 93306 TTE W/DOPPLER COMPLETE: CPT

## 2022-02-12 PROCEDURE — B54CZZ3 ULTRASONOGRAPHY OF LEFT LOWER EXTREMITY VEINS, INTRAVASCULAR: ICD-10-PCS | Performed by: SURGERY

## 2022-02-12 PROCEDURE — 06CG3ZZ EXTIRPATION OF MATTER FROM LEFT EXTERNAL ILIAC VEIN, PERCUTANEOUS APPROACH: ICD-10-PCS | Performed by: SURGERY

## 2022-02-12 DEVICE — IMPLANTABLE DEVICE: Type: IMPLANTABLE DEVICE | Site: COMMON ILIAC | Status: FUNCTIONAL

## 2022-02-12 RX ORDER — HYDROMORPHONE HYDROCHLORIDE 1 MG/ML
0.2 INJECTION, SOLUTION INTRAMUSCULAR; INTRAVENOUS; SUBCUTANEOUS
Status: DISCONTINUED | OUTPATIENT
Start: 2022-02-12 | End: 2022-02-12 | Stop reason: HOSPADM

## 2022-02-12 RX ORDER — SODIUM CHLORIDE 0.9 % (FLUSH) 0.9 %
5-40 SYRINGE (ML) INJECTION EVERY 8 HOURS
Status: DISCONTINUED | OUTPATIENT
Start: 2022-02-12 | End: 2022-02-12 | Stop reason: HOSPADM

## 2022-02-12 RX ORDER — ACETAMINOPHEN 325 MG/1
650 TABLET ORAL ONCE
Status: CANCELLED | OUTPATIENT
Start: 2022-02-12 | End: 2022-02-12

## 2022-02-12 RX ORDER — SODIUM CHLORIDE, SODIUM LACTATE, POTASSIUM CHLORIDE, CALCIUM CHLORIDE 600; 310; 30; 20 MG/100ML; MG/100ML; MG/100ML; MG/100ML
100 INJECTION, SOLUTION INTRAVENOUS CONTINUOUS
Status: DISCONTINUED | OUTPATIENT
Start: 2022-02-12 | End: 2022-02-12 | Stop reason: HOSPADM

## 2022-02-12 RX ORDER — SODIUM CHLORIDE, SODIUM LACTATE, POTASSIUM CHLORIDE, CALCIUM CHLORIDE 600; 310; 30; 20 MG/100ML; MG/100ML; MG/100ML; MG/100ML
INJECTION, SOLUTION INTRAVENOUS
Status: DISCONTINUED | OUTPATIENT
Start: 2022-02-12 | End: 2022-02-12 | Stop reason: HOSPADM

## 2022-02-12 RX ORDER — SUCCINYLCHOLINE CHLORIDE 20 MG/ML
INJECTION INTRAMUSCULAR; INTRAVENOUS AS NEEDED
Status: DISCONTINUED | OUTPATIENT
Start: 2022-02-12 | End: 2022-02-12 | Stop reason: HOSPADM

## 2022-02-12 RX ORDER — FENTANYL CITRATE 50 UG/ML
INJECTION, SOLUTION INTRAMUSCULAR; INTRAVENOUS AS NEEDED
Status: DISCONTINUED | OUTPATIENT
Start: 2022-02-12 | End: 2022-02-12 | Stop reason: HOSPADM

## 2022-02-12 RX ORDER — MIDAZOLAM HYDROCHLORIDE 1 MG/ML
INJECTION, SOLUTION INTRAMUSCULAR; INTRAVENOUS AS NEEDED
Status: DISCONTINUED | OUTPATIENT
Start: 2022-02-12 | End: 2022-02-12 | Stop reason: HOSPADM

## 2022-02-12 RX ORDER — LIDOCAINE HYDROCHLORIDE 10 MG/ML
0.1 INJECTION, SOLUTION EPIDURAL; INFILTRATION; INTRACAUDAL; PERINEURAL AS NEEDED
Status: CANCELLED | OUTPATIENT
Start: 2022-02-12

## 2022-02-12 RX ORDER — MIDAZOLAM HYDROCHLORIDE 1 MG/ML
1 INJECTION, SOLUTION INTRAMUSCULAR; INTRAVENOUS AS NEEDED
Status: CANCELLED | OUTPATIENT
Start: 2022-02-12

## 2022-02-12 RX ORDER — KETOROLAC TROMETHAMINE 30 MG/ML
INJECTION, SOLUTION INTRAMUSCULAR; INTRAVENOUS
Status: DISCONTINUED
Start: 2022-02-12 | End: 2022-02-12 | Stop reason: HOSPADM

## 2022-02-12 RX ORDER — SODIUM CHLORIDE 0.9 % (FLUSH) 0.9 %
5-40 SYRINGE (ML) INJECTION EVERY 8 HOURS
Status: CANCELLED | OUTPATIENT
Start: 2022-02-12

## 2022-02-12 RX ORDER — PROPOFOL 10 MG/ML
INJECTION, EMULSION INTRAVENOUS AS NEEDED
Status: DISCONTINUED | OUTPATIENT
Start: 2022-02-12 | End: 2022-02-12 | Stop reason: HOSPADM

## 2022-02-12 RX ORDER — PHENYLEPHRINE HCL IN 0.9% NACL 0.4MG/10ML
SYRINGE (ML) INTRAVENOUS AS NEEDED
Status: DISCONTINUED | OUTPATIENT
Start: 2022-02-12 | End: 2022-02-12 | Stop reason: HOSPADM

## 2022-02-12 RX ORDER — ROCURONIUM BROMIDE 10 MG/ML
INJECTION, SOLUTION INTRAVENOUS AS NEEDED
Status: DISCONTINUED | OUTPATIENT
Start: 2022-02-12 | End: 2022-02-12 | Stop reason: HOSPADM

## 2022-02-12 RX ORDER — ENOXAPARIN SODIUM 100 MG/ML
100 INJECTION SUBCUTANEOUS EVERY 12 HOURS
Qty: 10 ML | Refills: 0 | Status: SHIPPED | OUTPATIENT
Start: 2022-02-12 | End: 2022-02-17

## 2022-02-12 RX ORDER — NEOSTIGMINE METHYLSULFATE 1 MG/ML
INJECTION, SOLUTION INTRAVENOUS AS NEEDED
Status: DISCONTINUED | OUTPATIENT
Start: 2022-02-12 | End: 2022-02-12 | Stop reason: HOSPADM

## 2022-02-12 RX ORDER — FENTANYL CITRATE 50 UG/ML
25 INJECTION, SOLUTION INTRAMUSCULAR; INTRAVENOUS
Status: DISCONTINUED | OUTPATIENT
Start: 2022-02-12 | End: 2022-02-12 | Stop reason: HOSPADM

## 2022-02-12 RX ORDER — SODIUM CHLORIDE, SODIUM LACTATE, POTASSIUM CHLORIDE, CALCIUM CHLORIDE 600; 310; 30; 20 MG/100ML; MG/100ML; MG/100ML; MG/100ML
50 INJECTION, SOLUTION INTRAVENOUS CONTINUOUS
Status: CANCELLED | OUTPATIENT
Start: 2022-02-12 | End: 2022-02-13

## 2022-02-12 RX ORDER — GLYCOPYRROLATE 0.2 MG/ML
INJECTION INTRAMUSCULAR; INTRAVENOUS AS NEEDED
Status: DISCONTINUED | OUTPATIENT
Start: 2022-02-12 | End: 2022-02-12 | Stop reason: HOSPADM

## 2022-02-12 RX ORDER — CEFAZOLIN SODIUM 1 G/3ML
INJECTION, POWDER, FOR SOLUTION INTRAMUSCULAR; INTRAVENOUS AS NEEDED
Status: DISCONTINUED | OUTPATIENT
Start: 2022-02-12 | End: 2022-02-12 | Stop reason: HOSPADM

## 2022-02-12 RX ORDER — LIDOCAINE HYDROCHLORIDE 20 MG/ML
INJECTION, SOLUTION EPIDURAL; INFILTRATION; INTRACAUDAL; PERINEURAL AS NEEDED
Status: DISCONTINUED | OUTPATIENT
Start: 2022-02-12 | End: 2022-02-12 | Stop reason: HOSPADM

## 2022-02-12 RX ORDER — SODIUM CHLORIDE 0.9 % (FLUSH) 0.9 %
5-40 SYRINGE (ML) INJECTION AS NEEDED
Status: DISCONTINUED | OUTPATIENT
Start: 2022-02-12 | End: 2022-02-12 | Stop reason: HOSPADM

## 2022-02-12 RX ORDER — ENOXAPARIN SODIUM 100 MG/ML
100 INJECTION SUBCUTANEOUS EVERY 12 HOURS
Status: DISCONTINUED | OUTPATIENT
Start: 2022-02-12 | End: 2022-02-12 | Stop reason: HOSPADM

## 2022-02-12 RX ORDER — KETOROLAC TROMETHAMINE 30 MG/ML
INJECTION, SOLUTION INTRAMUSCULAR; INTRAVENOUS AS NEEDED
Status: DISCONTINUED | OUTPATIENT
Start: 2022-02-12 | End: 2022-02-12 | Stop reason: HOSPADM

## 2022-02-12 RX ORDER — DEXAMETHASONE SODIUM PHOSPHATE 4 MG/ML
INJECTION, SOLUTION INTRA-ARTICULAR; INTRALESIONAL; INTRAMUSCULAR; INTRAVENOUS; SOFT TISSUE AS NEEDED
Status: DISCONTINUED | OUTPATIENT
Start: 2022-02-12 | End: 2022-02-12 | Stop reason: HOSPADM

## 2022-02-12 RX ORDER — HEPARIN SODIUM 1000 [USP'U]/ML
INJECTION, SOLUTION INTRAVENOUS; SUBCUTANEOUS AS NEEDED
Status: DISCONTINUED | OUTPATIENT
Start: 2022-02-12 | End: 2022-02-12 | Stop reason: HOSPADM

## 2022-02-12 RX ORDER — SODIUM CHLORIDE 0.9 % (FLUSH) 0.9 %
5-40 SYRINGE (ML) INJECTION AS NEEDED
Status: CANCELLED | OUTPATIENT
Start: 2022-02-12

## 2022-02-12 RX ORDER — ENOXAPARIN SODIUM 100 MG/ML
100 INJECTION SUBCUTANEOUS EVERY 12 HOURS
Qty: 11 ML | Refills: 0 | Status: SHIPPED | OUTPATIENT
Start: 2022-02-12 | End: 2022-02-18

## 2022-02-12 RX ORDER — ONDANSETRON 2 MG/ML
4 INJECTION INTRAMUSCULAR; INTRAVENOUS AS NEEDED
Status: DISCONTINUED | OUTPATIENT
Start: 2022-02-12 | End: 2022-02-12 | Stop reason: HOSPADM

## 2022-02-12 RX ORDER — FENTANYL CITRATE 50 UG/ML
50 INJECTION, SOLUTION INTRAMUSCULAR; INTRAVENOUS AS NEEDED
Status: CANCELLED | OUTPATIENT
Start: 2022-02-12

## 2022-02-12 RX ORDER — ENOXAPARIN SODIUM 100 MG/ML
100 INJECTION SUBCUTANEOUS ONCE
Status: COMPLETED | OUTPATIENT
Start: 2022-02-12 | End: 2022-02-12

## 2022-02-12 RX ADMIN — FENTANYL CITRATE 50 MCG: 50 INJECTION, SOLUTION INTRAMUSCULAR; INTRAVENOUS at 10:38

## 2022-02-12 RX ADMIN — HEPARIN SODIUM 9000 UNITS: 1000 INJECTION, SOLUTION INTRAVENOUS; SUBCUTANEOUS at 09:22

## 2022-02-12 RX ADMIN — Medication 80 MCG: at 09:43

## 2022-02-12 RX ADMIN — PROPOFOL 150 MG: 10 INJECTION, EMULSION INTRAVENOUS at 08:41

## 2022-02-12 RX ADMIN — SODIUM CHLORIDE, SODIUM LACTATE, POTASSIUM CHLORIDE, CALCIUM CHLORIDE: 600; 310; 30; 20 INJECTION, SOLUTION INTRAVENOUS at 08:45

## 2022-02-12 RX ADMIN — SODIUM CHLORIDE, PRESERVATIVE FREE 10 ML: 5 INJECTION INTRAVENOUS at 17:03

## 2022-02-12 RX ADMIN — ROCURONIUM BROMIDE 10 MG: 10 SOLUTION INTRAVENOUS at 08:41

## 2022-02-12 RX ADMIN — SODIUM CHLORIDE, PRESERVATIVE FREE 10 ML: 5 INJECTION INTRAVENOUS at 06:22

## 2022-02-12 RX ADMIN — FENTANYL CITRATE 100 MCG: 50 INJECTION, SOLUTION INTRAMUSCULAR; INTRAVENOUS at 08:41

## 2022-02-12 RX ADMIN — FENTANYL CITRATE 50 MCG: 50 INJECTION, SOLUTION INTRAMUSCULAR; INTRAVENOUS at 10:08

## 2022-02-12 RX ADMIN — GLYCOPYRROLATE 0.4 MG: 0.2 INJECTION INTRAMUSCULAR; INTRAVENOUS at 10:28

## 2022-02-12 RX ADMIN — Medication 80 MCG: at 09:30

## 2022-02-12 RX ADMIN — NEOSTIGMINE METHYLSULFATE 2 MG: 1 INJECTION, SOLUTION INTRAVENOUS at 10:29

## 2022-02-12 RX ADMIN — SODIUM CHLORIDE, POTASSIUM CHLORIDE, SODIUM LACTATE AND CALCIUM CHLORIDE: 600; 310; 30; 20 INJECTION, SOLUTION INTRAVENOUS at 08:15

## 2022-02-12 RX ADMIN — SODIUM CHLORIDE, PRESERVATIVE FREE 10 ML: 5 INJECTION INTRAVENOUS at 17:04

## 2022-02-12 RX ADMIN — CEFAZOLIN 2 G: 330 INJECTION, POWDER, FOR SOLUTION INTRAMUSCULAR; INTRAVENOUS at 09:00

## 2022-02-12 RX ADMIN — SUCCINYLCHOLINE CHLORIDE 160 MG: 20 INJECTION, SOLUTION INTRAMUSCULAR; INTRAVENOUS at 08:41

## 2022-02-12 RX ADMIN — SODIUM CHLORIDE, POTASSIUM CHLORIDE, SODIUM LACTATE AND CALCIUM CHLORIDE 100 ML/HR: 600; 310; 30; 20 INJECTION, SOLUTION INTRAVENOUS at 00:26

## 2022-02-12 RX ADMIN — SODIUM CHLORIDE 40 MCG/MIN: 9 INJECTION, SOLUTION INTRAVENOUS at 09:52

## 2022-02-12 RX ADMIN — ACETAMINOPHEN 650 MG: 325 TABLET ORAL at 00:17

## 2022-02-12 RX ADMIN — Medication 30 MG: at 10:38

## 2022-02-12 RX ADMIN — MIDAZOLAM 2 MG: 1 INJECTION INTRAMUSCULAR; INTRAVENOUS at 08:41

## 2022-02-12 RX ADMIN — Medication 80 MCG: at 08:41

## 2022-02-12 RX ADMIN — LIDOCAINE HYDROCHLORIDE 80 MG: 20 INJECTION, SOLUTION EPIDURAL; INFILTRATION; INTRACAUDAL; PERINEURAL at 08:41

## 2022-02-12 RX ADMIN — ENOXAPARIN SODIUM 100 MG: 100 INJECTION SUBCUTANEOUS at 11:39

## 2022-02-12 RX ADMIN — DEXAMETHASONE SODIUM PHOSPHATE 4 MG: 4 INJECTION, SOLUTION INTRAMUSCULAR; INTRAVENOUS at 08:50

## 2022-02-12 RX ADMIN — ROCURONIUM BROMIDE 20 MG: 10 SOLUTION INTRAVENOUS at 09:05

## 2022-02-12 NOTE — DISCHARGE SUMMARY
Discharge Summary       PATIENT ID: Alexandra Mart  MRN: [id number]   YOB: 1970    DATE OF ADMISSION: 2/10/2022  5:24 PM    DATE OF DISCHARGE: 02/12/22   PRIMARY CARE PROVIDER: Campbell Ochoa DO     ATTENDING PHYSICIAN: Evaristo Heredia MD  DISCHARGING PROVIDER: Evaristo Heredia MD    To contact this individual call [phone] and ask the  to page. If unavailable ask to be transferred the Adult Hospitalist Department. CONSULTATIONS: IP CONSULT TO VASCULAR SURGERY  IP CONSULT TO HEMATOLOGY    PROCEDURES/SURGERIES: Procedure(s) with comments:  RE Look at [address] with DVT SUCTION and stenting - RE Look at [address] with DVT SUCTION and stenting    [id number] [facility] COURSE:   HPI: \"This is a 43-year-old man with no significant past medical history, was in his usual state of health until several months ago when the patient developed pain and swelling of the left lower extremity.  Initially, the swelling was intermittent.  For the past several weeks, the swelling is constant, associated with pain.  The pain is also constant, described as dull ache with no known aggravating or relieving factors, 6/10 in severity.  The patient was initially seen at the emergency room on 01/25/2022 and was diagnosed with DVT of the left lower extremity.  He was discharged home on Eliquis.  The patient has been on Eliquis for the past 17 days.  No significant improvement in the left lower extremity swelling.  The pain is also not improving. Alvie Dandy patient has been seen by his primary care physician. [facility] has an outpatient appointment with the hematologist this coming Thursday.  Because of worsening swelling and pain, the patient was advised to come to the emergency room for reevaluation.  When the patient arrived at the emergency room, ultrasound of the left lower extremity shows worsening of the DVT.  The emergency room provider consulted interventional radiologist for evaluation for thrombectomy.  Radiologist advised starting the patient on heparin and further recommendation to follow.  The patient was started on heparin. Delano Ontiveros was referred to the hospitalist service for evaluation for admission.  No prior history of thromboembolism.  No family history of thromboembolism. \"    Patient was managed for acute left lower extremity DVT status post suction thrombectomy and stenting left external and common iliac vein 2/12 and IVC filter placement done by vascular surgery. Placed on theapeutic Lovenox subcu for 5 days with instruction to resume Eliquis thereafter per vascular recs. Placed in Ace bandage with bearing as tolerated patient was able to ambulate. TTE was done discussed with cardiology mild RV strain management would not change mild  with AC and read can be f/u op. Patient has follow-up appointment with heme-onc 2/17. Directed follow-up with PCP, vascular, hematology oncology outpatient. DC home today.          DISCHARGE DIAGNOSES / PLAN:      Acute deep vein thrombosis, left lower extremity  Subsegmental PE  -Appears unprovoked no history of clotting disorder in the family  -Patient had heme-onc follow-up outpatient plan for 2/17 for further work-up can f/u there op  -on on lovenox subq therapeutic > needs for 5 days then resume eliquis per vascular recs   -TTE ordered f/u read op  -Vascular following, appreciate recs s/p suction thrombectomy and stenting L external and common iliac vein 2/12 with IVC filter placed   -ACE wrap in place, WBAT, stable for dc from vascular standpt      HypoK   -replete PRN   -f/u bmps       Elevated blood pressure  -possibly hospital-acquired we will continue to monitor add as needed blood pressure meds          Code status: full  DVT prophylaxis: lovenox subq     Care Plan discussed with: Patient/Family, Nurse and   Anticipated Disposition: Home w/Family  Anticipated Discharge: Greater than 48 hours           FOLLOW UP APPOINTMENTS:    Follow-up Information     Follow up With Specialties Details Why [address] [facility] [place], [address] In 3 days  [address]  [address] [facility]      Maribeth Loomis MD Vascular Surgery, General Surgery In 1 week  [address]      Jessica Abbott MD Hematology and Oncology, Hematology, Oncology In 1 week  [phone]             ADDITIONAL CARE RECOMMENDATIONS: Repeat CBC, BMP 3 to 5-day    DIET: Resume previous diet    ACTIVITY: Activity as tolerated        DISCHARGE MEDICATIONS:  Current Discharge Medication List      START taking these medications    Details   !! enoxaparin (Lovenox) 100 mg/mL 100 mg by SubCUTAneous route every twelve (12) hours for 11 doses. After Lovenox has finished resume your Eliquis on the same dosing schedule  Qty: 11 mL, Refills: 0  Start date: 2/12/2022, End date: 2/18/2022      !! enoxaparin (LOVENOX) 100 mg/mL 100 mg by SubCUTAneous route every twelve (12) hours every twelve (12) hours for 5 days. Qty: 10 mL, Refills: 0  Start date: 2/12/2022, End date: 2/17/2022       !! - Potential duplicate medications found. Please discuss with provider. CONTINUE these medications which have NOT CHANGED    Details   acetaminophen (TylenoL) 325 mg tablet Take  by mouth every four (4) hours as needed for Pain. apixaban (Eliquis) 5 mg tablet Take 1 Tablet by mouth two (2) times a day. Qty: 60 Tablet, Refills: 1    Associated Diagnoses: Acute deep vein thrombosis (DVT) of proximal vein of left lower extremity (HCC)               NOTIFY YOUR PHYSICIAN FOR ANY OF THE FOLLOWING:   Fever over 101 degrees for 24 hours. Chest pain, shortness of breath, fever, chills, nausea, vomiting, diarrhea, change in mentation, falling, weakness, bleeding. Severe pain or pain not relieved by medications.   Or, any other signs or symptoms that you may have questions about. DISPOSITION:    Home With:   OT  PT  HH  RN       Long term SNF/Inpatient Rehab   x Independent/assisted living    Hospice    Other:       PATIENT CONDITION AT DISCHARGE:     Functional status    Poor     Deconditioned    x Independent      Cognition   x  Lucid     Forgetful     Dementia        Code status     Full code     DNR      PHYSICAL EXAMINATION AT DISCHARGE:    Visit Vitals  /74   Pulse 63   Temp 97.6 °F (36.4 °C)   Resp 15   Ht 6' 2\" (1.88 m)   Wt 96.2 kg (212 lb)   SpO2 97%   BMI 27.22 kg/m²     I had a face to face encounter with this patient and independently examined them on 2/13/2022 as outlined below:                                                   Constitutional:  No acute distress, cooperative, pleasant    ENT:  Oral mucosa moist, oropharynx benign. Resp:  CTA bilaterally. No wheezing/rhonchi/rales. No accessory muscle use   CV:  Regular rhythm, normal rate, no murmurs, gallops, rubs    GI:  Soft, non distended, non tender. normoactive bowel sounds, no hepatosplenomegaly     Musculoskeletal:  No edema, warm, 2+ pulses throughout, LLE wrapped     Neurologic:  Moves all extremities.   AAOx3, CN II-XII reviewed         CHRONIC MEDICAL DIAGNOSES:  Problem List as of 2/12/2022 Date Reviewed: 2/11/2022          Codes Class Noted - Resolved    * (Principal) Acute deep vein thrombosis (DVT) of left lower extremity ([facility] [address] ICD-10-CM: I82.402  ICD-9-CM: 453.40  2/10/2022 - Present        Bronchitis ICD-10-CM: J40  ICD-9-CM: 176  3/25/2011 - Present              Greater than 30 minutes were spent with the patient on counseling and coordination of care    Signed:   Emeterio Gilliam MD  2/12/2022  3:41 PM

## 2022-02-12 NOTE — ANESTHESIA POSTPROCEDURE EVALUATION
Post-Anesthesia Evaluation and Assessment    Patient: Malu Wills MRN: [id number]  SSN: [id number]    YOB: 1970  Age: 54 y.o. Sex: male      I have evaluated the patient and they are stable and ready for discharge from the PACU. Cardiovascular Function/Vital Signs  Visit Vitals  /71   Pulse 62   Temp 36.8 °C (98.2 °F)   Resp 12   Ht 6' 2\" (1.88 m)   Wt 101.6 kg (224 lb)   SpO2 98%   BMI 28.76 kg/m²       Patient is status post General anesthesia for Procedure(s):  LEFT LOWER EXTREMITY DVT SUCTION AND LYSIS. Nausea/Vomiting: None    Postoperative hydration reviewed and adequate. Pain:  Pain Scale 1: Numeric (0 - 10) (02/11/22 0418)  Pain Intensity 1: 3 (02/11/22 0418)   Managed    Neurological Status: At baseline    Mental Status, Level of Consciousness: Alert and  oriented to person, place, and time    Pulmonary Status:   O2 Device: Nasal cannula (02/11/22 2043)   Adequate oxygenation and airway patent    Complications related to anesthesia: None    Post-anesthesia assessment completed. No concerns    Signed By: Elida Montes De Oca MD     February 11, 2022              Procedure(s):  LEFT LOWER EXTREMITY DVT SUCTION AND LYSIS. MAC    <[email or web address]>    INITIAL Post-op Vital signs:   Vitals Value Taken Time   /73 02/11/22 2045   Temp 36.8 °C (98.2 °F) 02/11/22 2043   Pulse 62 02/11/22 2046   Resp 11 02/11/22 2046   SpO2 98 % 02/11/22 2046   Vitals shown include unvalidated device data.

## 2022-02-12 NOTE — PROGRESS NOTES
Cm received a call from [employer] asking cm to confirm patients preferred pharmacy [facility] Gulshan Pablo ([phone]) has lovenox prescription MD sent to [facility]. Cm contacted [facility] and they have patients prescription filled and ready for pickup-  Lovenox 100 mg twice a day 5 doses.

## 2022-02-12 NOTE — OP NOTES
Operative Report    Patient: Philip Christianson MRN: [id number]  SSN: [id number]    YOB: 1970  Age: 54 y.o. 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     Surgeon(s) and Role:     * Lakshmi Fontenot MD - Primary    Anesthesia: General     Procedure: Procedure(s):  Nonselective catheter placement IVC with cavagram  Left lower extremity venogram via sheath  Selective catheter placement right common iliac vein with iliac venogram  IVUS with interpretation left popliteal vein, femoral vein, common femoral vein, external iliac vein, common femoral vein, IVC  Percutaneous mechanical suction thrombectomy left popliteal vein, femoral vein, common femoral vein, external iliac vein, common iliac vein  Balloon venoplasty left popliteal vein, femoral vein, common femoral vein, external iliac vein, common iliac vein  Stenting of the left external and common iliac vein including post-dilation venoioplasty    Findings: Significant improvement in LLE clot burden. Some chronic appearing thrombus persist in the popliteal vein. Some largely dissolved thrombus in the femoral vein. There is some scar tissue vs. Chronic thrombus in the iliac vein. The IVC is widely patent. The common iliac vein just above the confluence with the internal iliac vein measures 16-18mm. After intervention there is good stent wall apposition distally with excellent flow through the stented segment. There is a small area with lack of stent wall apposition in an AP projection just prior to the confluence during high pressure contrast injection but overall the stent appears well anchored. There is no residual compression by the iliac artery. More peripherally there is near complete resolution of the thrombus burden.  Additionally at the end of the case the calf is soft and there is significantly less leg size discrepancy vs day prior. Procedure in Detail: The patient was transported to the operating room. The patient was intubated by anesthesia and then positioned prone on the operating room table. The patient was prepped and draped in a standard fashion. An appropriate surgical timeout was performed with all members of the team present. The patient was administered 2gm Ancef for the antibiotic prophylaxis. Cragg-Juliano catheter was cut, rewired, removed and discarded. The 7 Fr sheath was likewise removed, discarded and replaced with a 9Fr sheaht. A LLE venogram was performed via the sheath. At this point the patient was systemically heparinized. Wire access was established into the IVC. A venogram was performed and this was then followed by 035 IVUS. Findings as above. A Cat 8 lightning Penumbra catheter was introduced up to the confluence. Suction thrombectomy was performed from IVC down to the access site. A significant amount of thrombus was suctioned mostly partially dissolved c/w the overnight lytic infusion. The result was examined by IVUS. With the majority of the thrombus burden removed the vein was then dilated. A 12 mm balloon was used initially from the confluence to the mid femoral vein. Proximally the vein was then dilated further using a 16 then a 20mm balloon. Completion venogram and IVUS examination showed an excellent result. Repeat IVUS examination revealed continued compression at the iliac artery crossing point. A flush catheter was advanced into the IVC and a cavagram was performed. Next the right common iliac vein was selected followed by the flush catheter and a venogram was performed to rosy the confluence. Once this was done an 18mm Abre stent was selected. This was deployed from the IVC confluence and engaged into the external iliac vein. This had an excellent angiographic result.  The stent was post-dilated with a 16mm balloon peripherally and a 20mm balloon centrally. Completion venogram and IVUS examination was performed. Satisfied with this result the wires and catheters were removed. A 2-0 Chromic U-Sitch was placed around the sheath and the sheath was pulled. Manual pressure was held. The access site was then dressed with a 4x4 and Kerlex. The entire leg was then wrapped using an ACE wrap from foot to thigh. The patient tolerated the procedure very well. He was successfully extubated and transported to the recovery area in stable condition. Estimated Blood Loss:  150cc    Tourniquet Time: * No tourniquets in log *      Implants:   Implant Name Type Inv. Item Serial No.  Lot No. LRB No. Used Action   STENT VASC SELF EXPND [id number] MM DIANE NIT ABRE - SNA  STENT VASC SELF EXPND 18X100 MM DIANE NIT ABRE NA [employer]ASC[employer]_WD [id number] Left 1 Implanted               Specimens: * No specimens in log *        Drains: None                Complications: None    Counts: Sponge and needle counts were correct times two.     Signed By:  Aline Bettencourt MD     February 12, 2022

## 2022-02-12 NOTE — ROUTINE PROCESS
Patient: Philip Christianson MRN: [id number]  SSN: [id number]   YOB: 1970  Age: 54 y.o. Sex: male     Patient is status post Procedure(s) with comments:  RE Look at [address] with DVT SUCTION and stenting - RE Look at [address] with DVT SUCTION and stenting. Surgeon(s) and Role:     * Katherine Carpenter MD - Primary    Local/Dose/Irrigation:  See emar                  Peripheral IV 02/10/22 Right Antecubital (Active)   Site Assessment Clean, dry, & intact 02/12/22 0400   Phlebitis Assessment 0 02/12/22 0400   Infiltration Assessment 0 02/12/22 0400   Dressing Status Clean, dry, & intact 02/12/22 0400   Dressing Type Tape;Transparent 02/12/22 0400   Hub Color/Line Status Pink;Capped 02/12/22 0400   Action Taken Open ports on tubing capped 02/12/22 0400   Alcohol Cap Used Yes 02/12/22 0400       Peripheral IV 02/10/22 Left Hand (Active)   Site Assessment Clean, dry, & intact 02/12/22 0400   Phlebitis Assessment 0 02/12/22 0400   Infiltration Assessment 0 02/12/22 0400   Dressing Status Clean, dry, & intact 02/12/22 0400   Dressing Type Tape;Transparent 02/12/22 0400   Hub Color/Line Status Pink; Infusing 02/12/22 0400   Action Taken Open ports on tubing capped 02/12/22 0400   Alcohol Cap Used Yes 02/12/22 0400                           Dressing/Packing:  Incision 02/11/22 Leg Left-Dressing/Treatment: Gauze dressing/dressing sponge;Tegaderm/Transparent film dressing (02/12/22 0400)  Incision 02/12/22 Leg Left-Dressing/Treatment: Gauze dressing/dressing sponge;Tegaderm/Transparent film dressing (4x4s with kerlix and ace wrap) (02/12/22 1021)    Splint/Cast:  ]    Other:

## 2022-02-12 NOTE — PROGRESS NOTES
Bedside and Verbal shift change report given to Lanette García (oncoming nurse) by Liliam Noble (offgoing nurse). Report included the following information SBAR, Kardex, Intake/Output, Cardiac Rhythm SR and Alarm Parameters     Patient is alert and oriented. voiced no concern at present. Awaking to return to  OR for re-evaluation of L DVT.     0800 patient to OR by OR nurse x2  Via bed. Heparin and TPA infusing via sheath at present. 1120  Returned from OR. Patient is alert and oriented. Skin warm to touch  L leg with ace wrap to thigh C/D/I   Noted + pulses throughout. 1300 Diet taken well and tolerated. 1500 OOB to recliner tolerated well watching TV.     1710 IV's removed. VS done DC'ed home. Instructions given to patient.

## 2022-02-12 NOTE — PROGRESS NOTES
Chart and labs reviewed, patient admitted to ICU post s/p cath with tpa by vascular for LLE DVT. Hospitalist team with sign off. Reconsult once stable for transfer out of ICU.

## 2022-02-12 NOTE — PROGRESS NOTES
2228: TRANSFER - IN REPORT:    Verbal report received from Rodger Dill RN(name) on Quyen Connolly  being received from PACU (unit) for routine progression of care      Report consisted of patients Situation, Background, Assessment and   Recommendations(SBAR). Information from the following report(s) SBAR, Kardex, Intake/Output, MAR, Recent Results, Cardiac Rhythm NSR and Alarm Parameters  was reviewed with the receiving nurse. Opportunity for questions and clarification was provided. Assessment completed upon patients arrival to unit and care assumed. Patients arrives on 2 L NC. Alert and oriented x 4, HEART, PERRLA. Patient has alteplase running at a rate of 12.5 ml/hr through 8 Fr sheath in L popliteal vein and heparin running at 500 units/hr through an infusion catheter in L popliteal vein. Pulses palpable/present. Louise, NP at bedside to assess patient. Patient is to be NPO at midnight. Orders received from [facility], NP to start patient on LR at 100 ml/hr at midnight. Primary Nurse Melanie Perdue RN and Mercedes Banuelos RN performed a dual skin assessment on this patient No impairment noted  Jarred score is 20    Patients left lower extremity is red, swollen, and warm due to DVT. 0730: Bedside and Verbal shift change report given to MESSI garcia (oncoming nurse) by Jason Bowman RN (offgoing nurse). Report included the following information SBAR, Kardex, Intake/Output, MAR, Recent Results, Cardiac Rhythm NSR/SB and Alarm Parameters .

## 2022-02-12 NOTE — PROGRESS NOTES
Venogram and IVUS c/w May-Thurner w/ associated LLE DVT    Expect tomorrow will involve suction thrombectomy and stenting.      Should be OK to discharge as early as tomorrow PM.

## 2022-02-12 NOTE — PROGRESS NOTES
[facility] Adult  Hospitalist Group                                                                                          Hospitalist Progress Note  Maude Morales MD  Answering service: [phone] or [phone] from in house phone        Date of Service:  2022  NAME:  Antonette Correia  :  1970  MRN:  [id number]      Admission Summary:   HPI: \"This is a 42-year-old man with no significant past medical history, was in his usual state of health until several months ago when the patient developed pain and swelling of the left lower extremity. Initially, the swelling was intermittent. For the past several weeks, the swelling is constant, associated with pain. The pain is also constant, described as dull ache with no known aggravating or relieving factors, 6/10 in severity. The patient was initially seen at the emergency room on 2022 and was diagnosed with DVT of the left lower extremity. He was discharged home on Eliquis. The patient has been on Eliquis for the past 17 days. No significant improvement in the left lower extremity swelling. The pain is also not improving. .  The patient has been seen by his primary care physician. He has an outpatient appointment with the hematologist this coming Thursday. Because of worsening swelling and pain, the patient was advised to come to the emergency room for reevaluation. When the patient arrived at the emergency room, ultrasound of the left lower extremity shows worsening of the DVT. The emergency room provider consulted interventional radiologist for evaluation for thrombectomy. Radiologist advised starting the patient on heparin and further recommendation to follow. The patient was started on heparin. He was referred to the hospitalist service for evaluation for admission. No prior history of thromboembolism. No family history of thromboembolism. \"    Interval history / Subjective:   Patient seen examined earlier bedside. Denies any chest pain or shortness of breath. S/p tpa dvt yesterday. ICU reconsulted stable for transfer out       Assessment & Plan:     Acute deep vein thrombosis, left lower extremity  Subsegmental PE  -Appears unprovoked no history of clotting disorder in the family  -Patient had heme-onc follow-up outpatient plan for 2/17 for further work-up can f/u there op  -on on lovenox subq therapeutic > needs for 5 days then resume eliquis per vascular recs   -TTE ordered pending   -Vascular following, appreciate recs s/p suction thrombectomy and stenting L external and common iliac vein 2/12 with IVC filter placed   -ACE wrap in place, WBAT, stable for dc from vascular standpt     HypoK   -replete PRN   -f/u bmps      Elevated blood pressure  -possibly hospital-acquired we will continue to monitor add as needed blood pressure meds    Transfer to med/tele     Code status: full  DVT prophylaxis: lovenox [address] [facility] discussed with: Patient/Family, Nurse and   Anticipated Disposition: Home w/Family  Anticipated Discharge: Greater than 48 hours     Hospital Problems  Date Reviewed: 2/11/2022          Codes Class Noted POA    * (Principal) Acute deep vein thrombosis (DVT) of left lower extremity ([facility] [address] ICD-10-CM: L76.909  ICD-9-CM: 453.40  2/10/2022 Yes                Review of Systems:   A comprehensive review of systems was negative except for that written in the HPI. Vital Signs:    Last 24hrs VS reviewed since prior progress note.  Most recent are:  Visit Vitals  /75 (BP 1 Location: Left upper arm, BP Patient Position: At rest)   Pulse 65   Temp 97.6 °F (36.4 °C)   Resp 13   Ht 6' 2\" (1.88 m)   Wt 96.5 kg (212 lb 12.8 oz)   SpO2 100%   BMI 27.32 kg/m²         Intake/Output Summary (Last 24 hours) at 2/12/2022 1213  Last data filed at 2/12/2022 1025  Gross per 24 hour   Intake 1293.73 ml   Output 500 ml   Net 793.73 ml        Physical Examination:     I had a face to face encounter with this patient and independently examined them on 2/12/2022 as outlined below:          Constitutional:  No acute distress, cooperative, pleasant    ENT:  Oral mucosa moist, oropharynx benign. Resp:  CTA bilaterally. No wheezing/rhonchi/rales. No accessory muscle use   CV:  Regular rhythm, normal rate, no murmurs, gallops, rubs    GI:  Soft, non distended, non tender. normoactive bowel sounds, no hepatosplenomegaly     Musculoskeletal:  No edema, warm, 2+ pulses throughout, LLE wrapped     Neurologic:  Moves all extremities. AAOx3, CN II-XII reviewed            Data Review:    Review and/or order of clinical lab test  Review and/or order of tests in the radiology section of CPT  Review and/or order of tests in the medicine section of CPT      Labs:     Recent Labs     02/12/22 0211 02/11/22 0319   WBC 4.1 5.1   HGB 13.3 13.5   HCT 41.2 40.4    207     Recent Labs     02/12/22  0211 02/11/22  0319 02/10/22  1959    138 135*   K 3.7 3.4* 3.7    108 103   CO2 28 25 29   BUN 11 10 12   CREA 0.63* 0.70 0.86    100 90   CA 8.6 8.6 9.5   MG  --  2.0  --    PHOS  --  3.6  --      Recent Labs     02/11/22  0319 02/10/22  1959   ALT 19 26   AP 53 71   TBILI 0.7 0.7   TP 6.1* 8.4*   ALB 3.3* 4.4   GLOB 2.8 4.0     Recent Labs     02/11/22  1039 02/11/22 0320 02/10/22  2043   APTT 66.6* 56.9* 28.4      No results for input(s): FE, TIBC, PSAT, FERR in the last 72 hours. No results found for: FOL, RBCF   No results for input(s): PH, PCO2, PO2 in the last 72 hours. No results for input(s): CPK, CKNDX, TROIQ in the last 72 hours.     No lab exists for component: CPKMB  Lab Results   Component Value Date/Time    Cholesterol, total 183 02/11/2022 03:19 AM    HDL Cholesterol 85 02/11/2022 03:19 AM    LDL, calculated 82.6 02/11/2022 03:19 AM    Triglyceride 77 02/11/2022 03:19 AM    CHOL/HDL Ratio 2.2 02/11/2022 03:19 AM     No results found for: GLUCPOC  No results found for: COLOR, APPRN, SPGRU, REFSG, SHERON, PROTU, GLUCU, KETU, BILU, UROU, AME, LEUKU, GLUKE, EPSU, BACTU, WBCU, RBCU, CASTS, UCRY      Medications Reviewed:     Current Facility-Administered Medications   Medication Dose Route Frequency    lactated Ringers infusion  100 mL/hr IntraVENous CONTINUOUS    sodium chloride (NS) flush 5-40 mL  5-40 mL IntraVENous Q8H    sodium chloride (NS) flush 5-40 mL  5-40 mL IntraVENous PRN    fentaNYL citrate (PF) injection 25 mcg  25 mcg IntraVENous Multiple    HYDROmorphone (DILAUDID) injection 0.2 mg  0.2 mg IntraVENous Multiple    ondansetron (ZOFRAN) injection 4 mg  4 mg IntraVENous PRN    ketorolac (TORADOL) 30 mg/mL (1 mL) injection        enoxaparin (LOVENOX) injection 100 mg  100 mg SubCUTAneous Q12H    oxyCODONE IR (ROXICODONE) tablet 5 mg  5 mg Oral PRN    ePHEDrine in NS (PF) (MISTOLE) 10 mg/mL in NS syringe 5 mg  5 mg IntraVENous PRN    sodium chloride (NS) flush 5-40 mL  5-40 mL IntraVENous Q8H    sodium chloride (NS) flush 5-40 mL  5-40 mL IntraVENous PRN    acetaminophen (TYLENOL) tablet 650 mg  650 mg Oral Q6H PRN    polyethylene glycol (MIRALAX) packet 17 g  17 g Oral DAILY PRN     ______________________________________________________________________  EXPECTED LENGTH OF STAY: 3d 21h  ACTUAL LENGTH OF STAY:          2                 Emeterio Gilliam MD

## 2022-02-12 NOTE — ANESTHESIA PREPROCEDURE EVALUATION
Relevant Problems   No relevant active problems       Anesthetic History   No history of anesthetic complications            Review of Systems / Medical History  Patient summary reviewed, nursing notes reviewed and pertinent labs reviewed    Pulmonary          Undiagnosed apnea      Comments: Subsegmental PE   Neuro/Psych   Within defined limits           Cardiovascular  Within defined limits                Exercise tolerance: >4 METS  Comments: LE DVT no clear etiology s/p tpa catheter lysis   GI/Hepatic/Renal  Within defined limits              Endo/Other  Within defined limits           Other Findings              Physical Exam    Airway  Mallampati: II  TM Distance: > 6 cm  Neck ROM: normal range of motion   Mouth opening: Normal     Cardiovascular  Regular rate and rhythm,  S1 and S2 normal,  no murmur, click, rub, or gallop             Dental  No notable dental hx       Pulmonary  Breath sounds clear to auscultation               Abdominal  GI exam deferred       Other Findings            Anesthetic Plan    ASA: 2  Anesthesia type: general          Induction: Intravenous  Anesthetic plan and risks discussed with: Patient

## 2022-02-12 NOTE — PERIOP NOTES
TRANSFER - OUT REPORT:    Verbal report given to MESSI Beckett(name) on Vik Pinzon  being transferred to ICU Room 10 (unit) for routine progression of care       Report consisted of patients Situation, Background, Assessment and   Recommendations(SBAR). Time Pre op antibiotic given:N/A  Anesthesia Stop time: 2044  Donovan Present on Transfer to floor:no   Order for Donovan on Chart:no  Discharge Prescriptions with Chart:no    Information from the following report(s) SBAR, OR Summary, Procedure Summary, Intake/Output, MAR, Recent Results and Cardiac Rhythm NSR was reviewed with the receiving nurse. Opportunity for questions and clarification was provided. Is the patient on 02? NO         Is the patient on a monitor? YES    Is the nurse transporting with the patient? YES    Surgical Waiting Area notified of patient's transfer from PACU? YES      The following personal items collected during your admission accompanied patient upon transfer:   Dental Appliance: Dental Appliances: None  Vision: Visual Aid: Glasses  Hearing Aid:    Jewelry: Jewelry: Watch,With patient  Clothing: Clothing: Shirt,Pants,Jacket/Coat,With patient  Other Valuables: Other Valuables: Cell Phone  Valuables sent to safe:       Wife will get valuables from room 210 and bring to ICU room 10    MESSI Beckett will call PACU when room is clean

## 2022-02-12 NOTE — PROGRESS NOTES
Successful suction thrombectomy LLE followed by iliac vein stenting. The patient tolerated the procedure very well. From my standpoint OK for discharge to home today. --ACE wrap to remain in place tomorrow then just compression socks  --Dressing can be removed tomorrow. There is a small suture which will dissolve by itself  --OK to shower soap and water once daily   --Lovenox x5 days then return to Eliquis - First dose in PACU now, next dose in PM  --Patient will see me back in the office the week of 2/21.  The office will call to arrange the appointment

## 2022-02-12 NOTE — OP NOTES
Operative Report    Patient: Justin Silva MRN: [id number]  SSN: [id number]    YOB: 1970  Age: 54 y.o. 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 The popliteal vein has chronic appearing thrombus. This dense thrombus is present through the distal popliteal vein. The femoral vein has more acute appearing thrombus. By mid femoral vein there is a flow channel. The common femoral vein and external iliac vein are relatively spared of thrombus burden. There is dense scar tissue and some chronic appearing thrombus within the common iliac vein. There is severe compression/near complete occlusion of the left common iliac vein at the crossing of the iliac artery. The normal appearing vein measures close to 18mm. The IVC is widely patent with no thrombus. It is somewhat underfilled c/w him having been NPO all day. Procedure in Detail: The patient was transported to the operating room. He was positioned prone on the operating room table and monitoring per anesthesia. The patient was prepped and draped in a standard fashion. An appropriate surgical timeout was performed with all members of the team present. The left behind knee popliteal vein was closely examined under ultrasound. An appropriate cannulation site was selected and the left small saphenous vein was accessed under direct ultrasound visualization just distal to the pop vein confluence.  Access was then sequentially up-sized to an 8Fr sheath. At this point the patient was systemically heparinized. Wire access was established into the IVC. A venogram was performed and this was then followed by 035 IVUS. Findings as above. A Cragg-Juliano catheter was advanced into the thrombus and 8mg TPA was injected. The catheter was then positioned in the thrombus. The sheath was secured to the skin x2 with 2-0 Silk suture which was also used to secure the catheter in place. The catheter was secured to the skin with a large Tegaderm. TPA will be infused via the catheter at a rate of 0.5mg per hour. Heparin will infuse via the sheath at a rate of 500U per hour. The patient tolerated the procedure very well. The patient was transported to the recovery area in stable condition with a plan for relook tomorrow with likely suction thrombectomy and stenting. .     Estimated Blood Loss:  Minimal    Tourniquet Time: * No tourniquets in log *      Implants: * No implants in log *            Specimens: * No specimens in log *        Drains: None                Complications: None    Counts: Sponge and needle counts were correct times two.     Signed By:  Barbara Anaya MD     February 11, 2022

## 2022-02-12 NOTE — DISCHARGE INSTRUCTIONS
Javier Laurent MD   Physician   Internal Medicine   Discharge Summary       Signed   Date of Service:  02/12/22 1541                       []Luisito copied text    []Macho for details       Discharge Summary         PATIENT ID: Robert Peña  MRN: [id number]   YOB: 1970    DATE OF ADMISSION: 2/10/2022  5:24 PM    DATE OF DISCHARGE: 02/12/22   PRIMARY CARE PROVIDER: Matilda Sánchez DO      ATTENDING PHYSICIAN: Mike Fisher MD  DISCHARGING PROVIDER: Mike Fisher MD    To contact this individual call [phone] and ask the  to page. If unavailable ask to be transferred the Adult Hospitalist Department.     CONSULTATIONS: IP CONSULT TO VASCULAR SURGERY  IP CONSULT TO HEMATOLOGY     PROCEDURES/SURGERIES: Procedure(s) with comments:  RE Look at [address] with DVT SUCTION and stenting - RE Look at [address] with DVT SUCTION and stenting     [id number] [facility] COURSE:   HPI: \"This is a 72-year-old man with no significant past medical history, was in his usual state of health until several months ago when the patient developed pain and swelling of the left lower extremity.  Initially, the swelling was intermittent.  For the past several weeks, the swelling is constant, associated with pain.  The pain is also constant, described as dull ache with no known aggravating or relieving factors, 6/10 in severity.  The patient was initially seen at the emergency room on 01/25/2022 and was diagnosed with DVT of the left lower extremity.  He was discharged home on Eliquis.  The patient has been on Eliquis for the past 17 days.  No significant improvement in the left lower extremity swelling.  The pain is also not improving. Nasir George patient has been seen by his primary care physician. Nena Manzanares has an outpatient appointment with the hematologist this coming Thursday.  Because of worsening swelling and pain, the patient was advised to come to the emergency room for reevaluation.  When the patient arrived at the emergency room, ultrasound of the left lower extremity shows worsening of the DVT.  The emergency room provider consulted interventional radiologist for evaluation for thrombectomy.  Radiologist advised starting the patient on heparin and further recommendation to follow.  The patient was started on heparin. Ellie Vargas was referred to the hospitalist service for evaluation for admission.  No prior history of thromboembolism.  No family history of thromboembolism. \"     Patient was managed for acute left lower extremity DVT status post suction thrombectomy and stenting left external and common iliac vein 2/12 and IVC filter placement done by vascular surgery. Placed on theapeutic Lovenox subcu for 5 days with instruction to resume Eliquis thereafter per vascular recs. Placed in Ace bandage with bearing as tolerated patient was able to ambulate. TTE was done discussed with cardiology management would not change with [facility] and read can be f/u op. Patient has follow-up appointment with heme-onc 2/17. Directed follow-up with PCP, vascular, hematology oncology outpatient.   DC home today.            DISCHARGE DIAGNOSES / PLAN:       Acute deep vein thrombosis, left lower extremity  Subsegmental PE  -Appears unprovoked no history of clotting disorder in the family  -Patient had heme-onc follow-up outpatient plan for 2/17 for further work-up can f/u there op  -on on lovenox subq therapeutic > needs for 5 days then resume eliquis per vascular recs   -TTE ordered f/u read op  -Vascular following, appreciate recs s/p suction thrombectomy and stenting L external and common iliac vein 2/12 with IVC filter placed   -ACE wrap in place, WBAT, stable for dc from vascular standpt      HypoK   -replete PRN   -f/u bmps       Elevated blood pressure  -possibly hospital-acquired we will continue to monitor add as needed blood pressure meds           Code status: full  DVT prophylaxis: lovenox subq     Care Plan discussed with: Patient/Family, Nurse and   Anticipated Disposition: Home w/Family  Anticipated Discharge: Greater than 48 hours               FOLLOW UP APPOINTMENTS:             Follow-up Information      Follow up With Specialties Details Why Contact Jerry Cotter, [address] In 3 days   [address]  [address] [facility]  [address]  [phone]        Carla Salomon MD Vascular Surgery, General Surgery In 1 week   [address] [address] [phone]        Maxi Garcias MD Hematology and Oncology, Hematology, Oncology In 1 week   [address] [facility] [address] [phone]                ADDITIONAL CARE RECOMMENDATIONS: Repeat CBC, BMP 3 to 5-day     DIET: Resume previous diet     ACTIVITY: Activity as tolerated           DISCHARGE MEDICATIONS:       Current Discharge Medication List            START taking these medications     Details   !! enoxaparin (Lovenox) 100 mg/mL 100 mg by SubCUTAneous route every twelve (12) hours for 11 doses. After Lovenox has finished resume your Eliquis on the same dosing schedule  Qty: 11 mL, Refills: 0  Start date: 2/12/2022, End date: 2/18/2022       !! enoxaparin (LOVENOX) 100 mg/mL 100 mg by SubCUTAneous route every twelve (12) hours every twelve (12) hours for 5 days. Qty: 10 mL, Refills: 0  Start date: 2/12/2022, End date: 2/17/2022        !! - Potential duplicate medications found. Please discuss with provider.            CONTINUE these medications which have NOT CHANGED     Details   acetaminophen (TylenoL) 325 mg tablet Take  by mouth every four (4) hours as needed for Pain.       apixaban (Eliquis) 5 mg tablet Take 1 Tablet by mouth two (2) times a day.   Qty: 60 Tablet, Refills: 1     Associated Diagnoses: Acute deep vein thrombosis (DVT) of proximal vein of left lower extremity (HCC)                    NOTIFY YOUR PHYSICIAN FOR ANY OF THE FOLLOWING:   Fever over 101 degrees for 24 hours. Chest pain, shortness of breath, fever, chills, nausea, vomiting, diarrhea, change in mentation, falling, weakness, bleeding. Severe pain or pain not relieved by medications. Or, any other signs or symptoms that you may have questions about.     DISPOSITION:    Home With:    OT   PT   HH   RN        Long term SNF/Inpatient Rehab   x Independent/assisted living     Hospice     Other:         PATIENT CONDITION AT DISCHARGE:      Functional status     Poor      Deconditioned    x Independent       Cognition   x  Lucid      Forgetful      Dementia          Code status     Full code      DNR       PHYSICAL EXAMINATION AT DISCHARGE:     Visit Vitals  /74   Pulse 63   Temp 97.6 °F (36.4 °C)   Resp 15   Ht 6' 2\" (1.88 m)   Wt 96.2 kg (212 lb)   SpO2 97%   BMI 27.22 kg/m²      I had a face to face encounter with this patient and independently examined them on 2/13/2022 as outlined below:                                                   Constitutional:  No acute distress, cooperative, pleasant    ENT:  Oral mucosa moist, oropharynx benign. Resp:  CTA bilaterally. No wheezing/rhonchi/rales. No accessory muscle use   CV:  Regular rhythm, normal rate, no murmurs, gallops, rubs    GI:  Soft, non distended, non tender. normoactive bowel sounds, no hepatosplenomegaly     Musculoskeletal:  No edema, warm, 2+ pulses throughout, LLE wrapped     Neurologic:  Moves all extremities.   AAOx3, CN II-XII reviewed            CHRONIC MEDICAL DIAGNOSES:             Problem List as of 2/12/2022 Date Reviewed: 2/11/2022           Codes Class Noted - Resolved     * (Principal) Acute deep vein thrombosis (DVT) of left lower extremity ([facility] [address] ICD-10-CM: I82.402  ICD-9-CM: 453.40   2/10/2022 - Present           Bronchitis ICD-10-CM: J40  ICD-9-CM: 807   3/25/2011 - Present                   Greater than 30 minutes were spent with the patient on counseling and coordination of care     Signed:   Emeterio Gilliam MD  2/12/2022  3:41 PM                 Routing History

## 2022-02-12 NOTE — CONSULTS
[facility]    ICU TEAM H & P Note    Name: Alexandra Mart   : 1970   MRN: [id number]   Date: 2022        Subjective:     Reason for ICU Admission: This is a 55 y/o male with a PMHx with recent dx of LLE DVT with swelling, pain and started on Eliquis. Since diagnosis, he has had increased LLE swelling and pain and presented back to ER and was admitted with consultation to vascular surgery Dr. Dionte Pete. He was taken to OR  and underwent placement of a venous sheath for catheter directed thrombolysis with tPA and heparin infusion and post procedure admission to ICU for close monitoring in NAD. On exam he denies pain, SOB, N/V and VSS. Past Medical History:      has no past medical history on file. Past Surgical History:      has a past surgical history that includes hx orthopaedic and hx shoulder arthroscopy. Home Medications:     Prior to Admission medications    Medication Sig Start Date End Date Taking? Authorizing Provider   acetaminophen (TylenoL) 325 mg tablet Take  by mouth every four (4) hours as needed for Pain. Provider, Historical   apixaban (Eliquis) 5 mg tablet Take 1 Tablet by mouth two (2) times a day. 22   Campbell Ochoa DO       Allergies/Social/Family History:     No Known Allergies   Social History     Tobacco Use    Smoking status: Never Smoker    Smokeless tobacco: Never Used   Substance Use Topics    Alcohol use: Yes     Comment: socially      History reviewed. No pertinent family history. Review of Systems:     Pertinent items are noted in HPI.     Objective:   Vital Signs:  Visit Vitals  /80   Pulse 66   Temp 97.9 °F (36.6 °C)   Resp 11   Ht 6' 2\" (1.88 m)   Wt 96.5 kg (212 lb 12.8 oz)   SpO2 99%   BMI 27.32 kg/m²    O2 Flow Rate (L/min): 3 l/min O2 Device: None (Room air) Temp (24hrs), Av.9 °F (36.6 °C), Min:97.7 °F (36.5 °C), Max:98.2 °F (36.8 °C)           Intake/Output:   No intake or output data in the 24 hours ending 02/11/22 4213    Physical Exam:    General:  Alert, cooperative, well noursished, well developed, appears stated age   Eyes:  Sclera anicteric. Pupils equally round and reactive to light. Mouth/Throat: Mucous membranes normal, oral pharynx clear   Neck: Supple   Lungs:   Clear to auscultation bilaterally, good effort   CV:  Regular rate and rhythm,no murmur, click, rub or gallop   Abdomen:   Soft, non-tender. bowel sounds normal. non-distended   Extremities: No cyanosis or edema   Skin: Skin color, texture, turgor normal. no acute rash or lesions   Lymph nodes: Cervical and supraclavicular normal   Musculoskeletal: No swelling or deformity   Lines/Devices:  Intact, no erythema, drainage or tenderness   Psych: Alert and oriented, normal mood affect given the setting       LABS AND  DATA: Personally reviewed  Recent Labs     02/11/22  0319 02/10/22  1959   WBC 5.1 6.1   HGB 13.5 15.5   HCT 40.4 47.2    248     Recent Labs     02/11/22  0319 02/10/22  1959    135*   K 3.4* 3.7    103   CO2 25 29   BUN 10 12   CREA 0.70 0.86    90   CA 8.6 9.5   MG 2.0  --    PHOS 3.6  --      Recent Labs     02/11/22  0319 02/10/22  1959   AP 53 71   TP 6.1* 8.4*   ALB 3.3* 4.4   GLOB 2.8 4.0     Recent Labs     02/11/22  1039 02/11/22  0320   APTT 66.6* 56.9*      No results for input(s): PHI, PCO2I, PO2I, FIO2I in the last 72 hours. No results for input(s): CPK, CKMB, TROIQ, BNPP in the last 72 hours. MEDS: Reviewed    Chest X-Ray:  CXR Results  (Last 48 hours)    None        CT Results  (Last 48 hours)               02/11/22 0819  CTA CHEST W OR W WO CONT Final result    Impression:      1. Nonocclusive embolism in the right lower lobar pulmonary artery. No right   heart strain. 2. No acute airspace disease. 3. Colonic diverticulosis without evidence of diverticulitis. Critical result was communicated to RN Paul Savers by Dr. Amada Fontenot at 9:45am on   2-11-22.        Narrative:  EXAM: CTA CHEST W OR W WO CONT, CT ABD PELV W CONT       INDICATION: Extensive DVT left lower extremity, evaluate for PE       COMPARISON: None        CONTRAST: 100 mL of Isovue-370. TECHNIQUE:    CTA chest: Helical thin section chest CT following uneventful intravenous   administration of nonionic contrast 100 mL of isovue 370 according to   departmental PE protocol. Coronal and sagittal reformats were performed. 3D post   processing was performed. CT dose reduction was achieved through the use of a   standardized protocol tailored for this examination and automatic exposure   control for dose modulation. CT abdomen/pelvis: Following the uneventful intravenous administration of   contrast, thin axial images were obtained through the abdomen and pelvis. Coronal and sagittal reconstructions were generated. Oral contrast was   administered, for improved visualization of the bowel lumen. CT dose reduction   was achieved through use of a standardized protocol tailored for this   examination and automatic exposure control for dose modulation. FINDINGS:    This is a good quality study for the evaluation of pulmonary embolism to the   first subsegmental arterial level. There is a single nonocclusive filling defect   in the right lower lobe artery        THYROID: No nodule. MEDIASTINUM: No mass or lymphadenopathy. BENNETT: No mass or lymphadenopathy. THORACIC AORTA: No aneurysm. HEART: Normal in size. No evidence of right heart strain. ESOPHAGUS: No wall thickening or dilatation. TRACHEA/BRONCHI: Patent. PLEURA: No effusion or pneumothorax. LUNGS: No nodule, mass, or airspace disease. LIVER: Subcentimeter left hepatic cyst.   BILIARY TREE: Gallbladder is within normal limits. CBD is not dilated. SPLEEN: within normal limits. PANCREAS: No mass or ductal dilatation. ADRENALS: Unremarkable. KIDNEYS: No mass, calculus, or hydronephrosis.  A few tiny hypodensities in the   kidneys bilaterally are too small to fully characterize but most likely reflect   cysts. STOMACH: Unremarkable. SMALL BOWEL: No dilatation or wall thickening. COLON: No dilatation or wall thickening. Sigmoid and descending colon   diverticulosis without evidence of diverticulitis. APPENDIX: Unremarkable. PERITONEUM: No ascites or pneumoperitoneum. RETROPERITONEUM: No lymphadenopathy or aortic aneurysm. REPRODUCTIVE ORGANS: Normal prostate. URINARY BLADDER: No mass or calculus. BONES: No destructive bone lesion. ABDOMINAL WALL: No mass or hernia. ADDITIONAL COMMENTS: N/A           02/11/22 0819  CT ABD PELV W CONT Final result    Impression:      1. Nonocclusive embolism in the right lower lobar pulmonary artery. No right   heart strain. 2. No acute airspace disease. 3. Colonic diverticulosis without evidence of diverticulitis. Critical result was communicated to MESSI Benson by Dr. Zachary Garcia at 9:45am on   2-11-22. Narrative:  EXAM: CTA CHEST W OR W WO CONT, CT ABD PELV W CONT       INDICATION: Extensive DVT left lower extremity, evaluate for PE       COMPARISON: None        CONTRAST: 100 mL of Isovue-370. TECHNIQUE:    CTA chest: Helical thin section chest CT following uneventful intravenous   administration of nonionic contrast 100 mL of isovue 370 according to   departmental PE protocol. Coronal and sagittal reformats were performed. 3D post   processing was performed. CT dose reduction was achieved through the use of a   standardized protocol tailored for this examination and automatic exposure   control for dose modulation. CT abdomen/pelvis: Following the uneventful intravenous administration of   contrast, thin axial images were obtained through the abdomen and pelvis. Coronal and sagittal reconstructions were generated. Oral contrast was   administered, for improved visualization of the bowel lumen.  CT dose reduction   was achieved through use of a standardized protocol tailored for this   examination and automatic exposure control for dose modulation. FINDINGS:    This is a good quality study for the evaluation of pulmonary embolism to the   first subsegmental arterial level. There is a single nonocclusive filling defect   in the right lower lobe artery        THYROID: No nodule. MEDIASTINUM: No mass or lymphadenopathy. BENNETT: No mass or lymphadenopathy. THORACIC AORTA: No aneurysm. HEART: Normal in size. No evidence of right heart strain. ESOPHAGUS: No wall thickening or dilatation. TRACHEA/BRONCHI: Patent. PLEURA: No effusion or pneumothorax. LUNGS: No nodule, mass, or airspace disease. LIVER: Subcentimeter left hepatic cyst.   BILIARY TREE: Gallbladder is within normal limits. CBD is not dilated. SPLEEN: within normal limits. PANCREAS: No mass or ductal dilatation. ADRENALS: Unremarkable. KIDNEYS: No mass, calculus, or hydronephrosis. A few tiny hypodensities in the   kidneys bilaterally are too small to fully characterize but most likely reflect   cysts. STOMACH: Unremarkable. SMALL BOWEL: No dilatation or wall thickening. COLON: No dilatation or wall thickening. Sigmoid and descending colon   diverticulosis without evidence of diverticulitis. APPENDIX: Unremarkable. PERITONEUM: No ascites or pneumoperitoneum. RETROPERITONEUM: No lymphadenopathy or aortic aneurysm. REPRODUCTIVE ORGANS: Normal prostate. URINARY BLADDER: No mass or calculus. BONES: No destructive bone lesion. ABDOMINAL WALL: No mass or hernia. ADDITIONAL COMMENTS: N/A               2/10/2022 US Duplex lower extremity:  · Acute occlusive thrombus present in the left mid and distal femoral vein, popliteal vein, gastrocnemius vein, and one of two posterior tibial veins. · The extent of the thrombus has not changed in length but previously non-occlusive segments are now occlusive. ABCDEF Bundle/Checklist Completed:   Yes    Active Problem List:     Problem List Date Reviewed: 2/11/2022          Codes Class    * (Principal) Acute deep vein thrombosis (DVT) of left lower extremity ([facility] [address] ICD-10-CM: I82.402  ICD-9-CM: 453.40         Bronchitis ICD-10-CM: J40  ICD-9-CM: 502             ICU Assessment/ Comprehensive Plan of Care:     1. 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 Asia Malagon  -Continue heparin and tPA infusions per vascular surgery orders  -Q4hr neuro vascular checks  -Hold home Eliquis  -Check fibrinogen Q6hr, first check at 2am  -NPO after MN for return to OR in am, 0800  - LR infusion at 0001 while patient is NPO    F - Feeding:  Regular diet, NPO after MN  A - Analgesia: acetaminophen  S - Sedation: GOAL RASS 0  T - DVT Prophylaxis: heparin, tPA  H - Head of Bed: > 30 Degrees  U - Ulcer Prophylaxis: NA  G - Glycemic Control: NA  S - Spontaneous Breathing Trial: NA  B - Bowel Regimen:  NA  I - Indwelling Catheter:              T/L/D  Tubes: None  Lines: Peripheral IV  Drains: None  D - De-escalation of Antibiotics: NA      DISPOSITION/COMMUNICATION  Discussed Plan of Care/Code Status: Full Code  Stay in ICU    CRITICAL CARE CONSULTANT NOTE  I had a face to face encounter with the patient, reviewed and interpreted patient data including clinical events, labs, images, vital signs, I/O's, and examined patient. I have discussed the case and the plan and management of the patient's care with the consulting services, the bedside nurses and the respiratory therapist.      NOTE OF PERSONAL INVOLVEMENT IN CARE   This patient has a high probability of imminent, clinically significant deterioration, which requires the highest level of preparedness to intervene urgently. I participated in the decision-making and personally managed or directed the management of the following life and organ supporting interventions that required my frequent assessment to treat or prevent imminent deterioration.     I personally spent 45 minutes of critical care time. This is time spent at this critically ill patient's bedside actively involved in patient care as well as the coordination of care and discussions with the patient's family. This does not include any procedural time which has been billed separately.     KYRA Cunha-BC  [address] [facility] Nurse Practitioner  [facility]  2/11/2022

## 2022-02-12 NOTE — ANESTHESIA POSTPROCEDURE EVALUATION
Post-Anesthesia Evaluation and Assessment    Patient: Juliette Polanco MRN: [id number]  SSN: [id number]    YOB: 1970  Age: 54 y.o. Sex: male      I have evaluated the patient and they are stable and ready for discharge from the PACU. Cardiovascular Function/Vital Signs  Visit Vitals  /74   Pulse 63   Temp 36.4 °C (97.6 °F)   Resp 15   Ht 6' 2\" (1.88 m)   Wt 96.5 kg (212 lb 12.8 oz)   SpO2 97%   BMI 27.32 kg/m²       Patient is status post General anesthesia for Procedure(s) with comments:  RE Look at [address] with DVT SUCTION and stenting - RE Look at [address] with DVT SUCTION and stenting. Nausea/Vomiting: None    Postoperative hydration reviewed and adequate. Pain:  Pain Scale 1: Numeric (0 - 10) (02/12/22 1120)  Pain Intensity 1: 3 (02/12/22 1120)   Managed    Neurological Status:   Neuro (WDL): Exceptions to WDL (02/12/22 1039)  Neuro  Neurologic State: Drowsy (02/12/22 1039)  Orientation Level: Oriented X4 (02/12/22 1039)  Cognition: Follows commands (02/12/22 0800)  Speech: Clear (02/12/22 0800)  Assessment L Pupil: Brisk;Round (02/12/22 0800)  Size L Pupil (mm): 3 (02/12/22 0800)  Assessment R Pupil: Brisk;Round (02/12/22 0800)  Size R Pupil (mm): 3 (02/12/22 0800)  LUE Motor Response: Spontaneous ; Purposeful (02/12/22 0800)  LLE Motor Response: Spontaneous ; Purposeful (02/12/22 0800)  RUE Motor Response: Spontaneous ; Purposeful (02/12/22 0800)  RLE Motor Response: Spontaneous ; Purposeful (02/12/22 0800)   At baseline    Mental Status, Level of Consciousness: Alert and  oriented to person, place, and time    Pulmonary Status:   O2 Device: Nasal cannula (02/12/22 1120)   Adequate oxygenation and airway patent    Complications related to anesthesia: None    Post-anesthesia assessment completed.  No concerns    Signed By: Werner West MD     February 12, 2022

## 2022-02-15 ENCOUNTER — OFFICE VISIT (OUTPATIENT)
Dept: PRIMARY CARE CLINIC | Age: 52
End: 2022-02-15
Payer: COMMERCIAL

## 2022-02-15 VITALS
RESPIRATION RATE: 18 BRPM | WEIGHT: 219 LBS | BODY MASS INDEX: 28.11 KG/M2 | DIASTOLIC BLOOD PRESSURE: 85 MMHG | SYSTOLIC BLOOD PRESSURE: 125 MMHG | HEIGHT: 74 IN | HEART RATE: 109 BPM | TEMPERATURE: 98.4 F

## 2022-02-15 DIAGNOSIS — I27.82 CHRONIC PULMONARY EMBOLISM, UNSPECIFIED PULMONARY EMBOLISM TYPE, UNSPECIFIED WHETHER ACUTE COR PULMONALE PRESENT (HCC): Primary | ICD-10-CM

## 2022-02-15 DIAGNOSIS — Z09 HOSPITAL DISCHARGE FOLLOW-UP: ICD-10-CM

## 2022-02-15 DIAGNOSIS — R10.33 PERIUMBILICAL ABDOMINAL PAIN: ICD-10-CM

## 2022-02-15 DIAGNOSIS — Z86.718 HISTORY OF DVT OF LOWER EXTREMITY: ICD-10-CM

## 2022-02-15 PROCEDURE — 99204 OFFICE O/P NEW MOD 45 MIN: CPT | Performed by: NURSE PRACTITIONER

## 2022-02-15 NOTE — PROGRESS NOTES
[facility]   Marbin Bird [address], [address] [facility]  P: [phone]  F: [phone]    SUBJECTIVE     HPI:     Juan José Crockett is a 54 y.o. male who is seen in the clinic for   Chief Complaint   Patient presents with   [facility] Follow Up      had surgery on 02/12/22-       On 1/25, patient went to [facility] for LLE swelling. Was dx with LLE DVT and subsequently placed on Eliquis starter-pack. However, on 2/10, patient began to have increasing swelling in his LLE along with moderate, dull, achy pain. He then went back to the ED, where Vascular Surgery/IR was consulted. They dx him with worsening DVT of LLE along with a stable RLL PE. He was then placed on a Heparin drip. In the morning of 2/12, a suction thrombectomy and stenting of the left external and common iliac vein in addition to an IVC filter placement was performed by Dr. Yuliya Morales. Later that day, he d/c home with instruction for the patient to administer SQ Lovenox twice daily for 5 days (currently on day 3). After 5 days, patient is to resume begin Eliquis starter pack. The patient has an appointment with Hem/Onc Dr. Nelia Stacy on 2/17. Per patient, the hospitalist was concerned that the patient may have a rare clotting d/o that caused the DVT. Patient is not aware of any Family hx of clotting d/o. Also, the patient has a f/u appointment with Dr. Bonny Cotto scheduled for next week. Status post surgery, the patient has noticed that he has no pain in LLE and that his \"circulation has improved along with my swelling\". However, the patient states that ever since the surgery, he has had intermittent \"gas-like\" pain in his periumbilical area. He describes the pain as a 3/10. He denies N/V/D, constipation, excessive burping/flatulence, hematuria/urinary symptoms.      Patient Active Problem List    Diagnosis    Acute deep vein thrombosis (DVT) of left lower extremity (HCC)    Bronchitis        Past Medical History: Diagnosis Date    Thromboembolus [facility])      Past Surgical History:   Procedure Laterality Date    HX ORTHOPAEDIC      HX SHOULDER ARTHROSCOPY      VASCULAR SURGERY PROCEDURE UNLIST       Social History     Socioeconomic History    Marital status:      Spouse name: Not on file    Number of children: Not on file    Years of education: Not on file    Highest education level: Not on file   Occupational History    Not on file   Tobacco Use    Smoking status: Never Smoker    Smokeless tobacco: Never Used   Vaping Use    Vaping Use: Never used   Substance and Sexual Activity    Alcohol use: Yes     Comment: socially    Drug use: Not Currently    Sexual activity: Not on file   Other Topics Concern    Not on file   Social History Narrative    Not on file     Social Determinants of Health     Financial Resource Strain:     Difficulty of Paying Living Expenses: Not on file   Food Insecurity:     Worried About [address] in the Last Year: Not on file    Jc of Food in the Last Year: Not on file   Transportation Needs:     Lack of Transportation (Medical): Not on file    Lack of Transportation (Non-Medical):  Not on file   Physical Activity:     Days of Exercise per Week: Not on file    Minutes of Exercise per Session: Not on file   Stress:     Feeling of Stress : Not on file   Social Connections:     Frequency of Communication with Friends and Family: Not on file    Frequency of Social Gatherings with Friends and Family: Not on file    Attends Jainism Services: Not on file    Active Member of Clubs or Organizations: Not on file    Attends Club or Organization Meetings: Not on file    Marital Status: Not on file   Intimate Partner Violence:     Fear of Current or Ex-Partner: Not on file    Emotionally Abused: Not on file    Physically Abused: Not on file    Sexually Abused: Not on file   Housing Stability:     Unable to Pay for Housing in the Last Year: Not on file    Number of Places Lived in the Last Year: Not on file    Unstable Housing in the Last Year: Not on file     History reviewed. No pertinent family history. Immunization History   Administered Date(s) Administered    COVID-19, Moderna Booster, PF, 0.25mL Dose 12/21/2021    COVID-19, Moderna, Primary or Immunocompromised Series, MRNA, PF, 100mcg/0.5mL 04/09/2021, 05/07/2021, 12/21/2021      No Known Allergies    No results displayed because visit has over 200 results. Admission on 01/25/2022, Discharged on 01/25/2022   Component Date Value Ref Range Status    WBC 01/25/2022 7.2  4.1 - 11.1 K/uL Final    RBC 01/25/2022 5.17  4.10 - 5.70 M/uL Final    HGB 01/25/2022 16.2  12.1 - 17.0 g/dL Final    HCT 01/25/2022 47.3  36.6 - 50.3 % Final    MCV 01/25/2022 91.5  80.0 - 99.0 FL Final    MCH 01/25/2022 31.3  26.0 - 34.0 PG Final    MCHC 01/25/2022 34.2  30.0 - 36.5 g/dL Final    RDW 01/25/2022 11.9  11.5 - 14.5 % Final    PLATELET 01/78/8296 706  150 - 400 K/uL Final    MPV 01/25/2022 10.2  8.9 - 12.9 FL Final    NRBC 01/25/2022 0.0  0  WBC Final    ABSOLUTE NRBC 01/25/2022 0.00  0.00 - 0.01 K/uL Final    NEUTROPHILS 01/25/2022 63  32 - 75 % Final    LYMPHOCYTES 01/25/2022 26  12 - 49 % Final    MONOCYTES 01/25/2022 9  5 - 13 % Final    EOSINOPHILS 01/25/2022 2  0 - 7 % Final    BASOPHILS 01/25/2022 0  0 - 1 % Final    IMMATURE GRANULOCYTES 01/25/2022 0  0.0 - 0.5 % Final    ABS. NEUTROPHILS 01/25/2022 4.4  1.8 - 8.0 K/UL Final    ABS. LYMPHOCYTES 01/25/2022 1.9  0.8 - 3.5 K/UL Final    ABS. MONOCYTES 01/25/2022 0.7  0.0 - 1.0 K/UL Final    ABS. EOSINOPHILS 01/25/2022 0.2  0.0 - 0.4 K/UL Final    ABS. BASOPHILS 01/25/2022 0.0  0.0 - 0.1 K/UL Final    ABS. IMM.  GRANS. 01/25/2022 0.0  0.00 - 0.04 K/UL Final    DF 01/25/2022 AUTOMATED    Final    Sodium 01/25/2022 138  136 - 145 mmol/L Final    Potassium 01/25/2022 3.9  3.5 - 5.1 mmol/L Final    Chloride 01/25/2022 103  97 - 108 mmol/L Final    CO2 01/25/2022 29  21 - 32 mmol/L Final    Anion gap 01/25/2022 6  5 - 15 mmol/L Final    Glucose 01/25/2022 102* 65 - 100 mg/dL Final    BUN 01/25/2022 11  6 - 20 MG/DL Final    Creatinine 01/25/2022 0.94  0.70 - 1.30 MG/DL Final    BUN/Creatinine ratio 01/25/2022 12  12 - 20   Final    GFR est AA 01/25/2022 >60  >60 ml/min/1.73m2 Final    GFR est non-AA 01/25/2022 >60  >60 ml/min/1.73m2 Final    Estimated GFR is calculated using the IDMS-traceable Modification of Diet in Renal Disease (MDRD) Study equation, reported for both  Americans (GFRAA) and non- Americans (GFRNA), and normalized to 1.73m2 body surface area. The physician must decide which value applies to the patient.  Calcium 01/25/2022 9.5  8.5 - 10.1 MG/DL Final    Bilirubin, total 01/25/2022 0.7  0.2 - 1.0 MG/DL Final    ALT (SGPT) 01/25/2022 27  12 - 78 U/L Final    AST (SGOT) 01/25/2022 18  15 - 37 U/L Final    Alk. phosphatase 01/25/2022 82  45 - 117 U/L Final    Protein, total 01/25/2022 7.9  6.4 - 8.2 g/dL Final    Albumin 01/25/2022 4.1  3.5 - 5.0 g/dL Final    Globulin 01/25/2022 3.8  2.0 - 4.0 g/dL Final    A-G Ratio 01/25/2022 1.1  1.1 - 2.2   Final    INR 01/25/2022 1.0  0.9 - 1.1   Final    A single therapeutic range for Vit K antagonists may not be optimal for all indications - see June, 2008 issue of Chest, American College of Chest Physicians Evidence-Based Clinical Practice Guidelines, 8th Edition.     Prothrombin time 01/25/2022 10.9  9.0 - 11.1 sec Final    aPTT 01/25/2022 27.9  22.1 - 31.0 sec Final    In addition to factor deficiency, monitoring heparin therapy, etc., evaluation of a prolonged aPTT result should include consideration of preanalytic variables such as heparin flush contamination, specimen integrity issues, etc.    aPTT, therapeutic range 01/25/2022      58.0 - 77.0 SECS Final    SAMPLES BEING HELD 01/25/2022 1red   Final    COMMENT 01/25/2022 Add-on orders for these samples will be processed based on acceptable specimen integrity and analyte stability, which may vary by analyte. Final      ECHO ADULT COMPLETE    Left Ventricle: Left ventricle size is normal. Normal wall thickness. Normal wall motion. The EF by visual approximation is greater than 65%.   Right Ventricle: Mildly reduced systolic function. XR [employer] IMAGING GUIDANCE  Narrative: Automatic Administrative Result. Imaging Guidance used in the Hybrid [facility]. See the Patient Chart   for specific details. Impression: : Imaging guidance utilized in [facility]. .     Current Outpatient Medications   Medication Sig Dispense Refill    enoxaparin (LOVENOX) 100 mg/mL 100 mg by SubCUTAneous route every twelve (12) hours every twelve (12) hours for 5 days. 10 mL 0    acetaminophen (TylenoL) 325 mg tablet Take  by mouth every four (4) hours as needed for Pain.  enoxaparin (Lovenox) 100 mg/mL 100 mg by SubCUTAneous route every twelve (12) hours for 11 doses. After Lovenox has finished resume your Eliquis on the same dosing schedule (Patient not taking: Reported on 2/15/2022) 11 mL 0    apixaban (Eliquis) 5 mg tablet Take 1 Tablet by mouth two (2) times a day. 60 Tablet 1           The past medical history, past surgical history, and family history were reviewed and updated in the medical record. Lab values/Imaging were reviewed. The medications were reviewed and updated in the medical record. Immunizations were reviewed and updated in the medical record. All relevant preventative screenings reviewed and updated in the medical record. REVIEW OF SYSTEMS   Review of Systems   Constitutional: Negative for chills, fever and malaise/fatigue. Respiratory: Negative for cough, hemoptysis, shortness of breath and wheezing. Cardiovascular: Positive for leg swelling. Negative for chest pain, palpitations, orthopnea and claudication. Gastrointestinal: Positive for abdominal pain.  Negative for blood in stool, constipation, diarrhea, nausea and vomiting. Genitourinary: Negative for dysuria, flank pain, frequency, hematuria and urgency. PHYSICAL EXAM   /85 (BP 1 Location: Right arm, BP Patient Position: Sitting, BP Cuff Size: Adult long)   Pulse (!) 109   Temp 98.4 °F (36.9 °C) (Temporal)   Resp 18   Ht 6' 2\" (1.88 m)   Wt 219 lb (99.3 kg)   BMI 28.12 kg/m²      Physical Exam  Constitutional:       Appearance: Normal appearance. Cardiovascular:      Rate and Rhythm: Normal rate and regular rhythm. Pulses: Normal pulses. Heart sounds: Normal heart sounds. Comments: Non-pitting edema in LLE. Pulmonary:      Effort: Pulmonary effort is normal.      Breath sounds: Normal breath sounds. Abdominal:      General: Abdomen is flat. Bowel sounds are normal. There is no distension. Palpations: Abdomen is soft. There is no mass. Tenderness: There is abdominal tenderness. Skin:     Capillary Refill: Capillary refill takes less than 2 seconds. Neurological:      General: No focal deficit present. Mental Status: He is alert and oriented to person, place, and time. ASSESSMENT/ PLAN   Below is the assessment and plan developed based on review of pertinent history, physical exam, labs, studies, and medications. 1. Chronic pulmonary embolism, unspecified pulmonary embolism type, unspecified whether acute cor pulmonale present (HCC)  RLL PE was noticed on CT scan on 2/10. Currently stable at this time. Advised patient to look for s/s of unstable PE. Will continue to monitor. 2. Hospital discharge follow-up  Patient's condition has improved remarkable s/p vascular surgery. He is aware of the need to continue anticoagulation therapy and to be mindful of the need to prevent injuries/falls while on either Lovenox/Eliquis. Will appreciate future recs from Vascular and Hem/ONC. 3. History of DVT of lower extremity  Patient is aware of s/s of DVT. Will continue to monitor patient to prevent future DVTs from developing. 4. Periumbilical abdominal pain  Discussed with patient that symptoms are likely related to the affects of vascular surgery. If symptoms worsen and become more bothersome to patient, a KUB can be performed. -     XR ABD (KUB); Future       Follow-up and Dispositions    · Return in about 4 weeks (around 3/15/2022), or DVT symptoms re-appear, for Annual physical exam.         Disclaimer:  Advised patient to call back or return to office if symptoms worsen/change/persist.  Discussed expected course/resolution/complications of diagnosis in detail with patient. Medication risks/benefits/alternatives discussed with patient. Patient was given an after visit summary which includes diagnoses, current medications, & vitals. Discussed patient instructions and advised to read to all patient instructions regarding care. Patient expressed understanding with the diagnosis and plan.        Casey Hay NP  2/15/2022

## 2022-02-15 NOTE — PROGRESS NOTES
Chief Complaint   Patient presents with   [facility] Follow Up      had surgery on 02/12/22-        Health Maintenance Due   Topic    Hepatitis C Screening         1. Have you been to the ER, urgent care clinic since your last visit? Hospitalized since your last visit? Yes Reason for visit: 02/12/22- surgery [facility]- DR. rowland-     2. Have you seen or consulted any other health care providers outside of the [address] [place] [address] since your last visit? Include any pap smears or colon screening.  No    Visit Vitals  /85 (BP 1 Location: Right arm, BP Patient Position: Sitting, BP Cuff Size: Adult long)   Pulse (!) 109   Temp 98.4 °F (36.9 °C) (Temporal)   Resp 18   Ht 6' 2\" (1.88 m)   Wt 219 lb (99.3 kg)   BMI 28.12 kg/m²

## 2022-02-17 ENCOUNTER — OFFICE VISIT (OUTPATIENT)
Dept: ONCOLOGY | Age: 52
End: 2022-02-17
Payer: COMMERCIAL

## 2022-02-17 VITALS
RESPIRATION RATE: 16 BRPM | OXYGEN SATURATION: 97 % | HEART RATE: 89 BPM | SYSTOLIC BLOOD PRESSURE: 128 MMHG | WEIGHT: 218.6 LBS | DIASTOLIC BLOOD PRESSURE: 75 MMHG | BODY MASS INDEX: 28.06 KG/M2 | HEIGHT: 74 IN

## 2022-02-17 DIAGNOSIS — I82.412 ACUTE DEEP VEIN THROMBOSIS (DVT) OF FEMORAL VEIN OF LEFT LOWER EXTREMITY (HCC): ICD-10-CM

## 2022-02-17 DIAGNOSIS — D68.59 HYPERCOAGULABLE STATE (HCC): Primary | ICD-10-CM

## 2022-02-17 DIAGNOSIS — I87.9: ICD-10-CM

## 2022-02-17 DIAGNOSIS — Z79.899 HIGH RISK MEDICATION USE: ICD-10-CM

## 2022-02-17 PROCEDURE — 99204 OFFICE O/P NEW MOD 45 MIN: CPT | Performed by: INTERNAL MEDICINE

## 2022-02-17 NOTE — PROGRESS NOTES
[facility] at [address], [address] [place], [place] [address] [address]  Jenny Doll: [phone]  F: [phone] Patient ID  Name: Philip Christianson  YOB: 1970  MRN: [id number]  Referring Provider:   Nedra Eckert [address]  [facility],  [address] [place] [address]  Primary Care Provider: Karl Polanco NP       HEMATOLOGY/MEDICAL ONCOLOGY  NOTE   Date of Visit: 02/17/22  Reason for Evaluation:     Chief Complaint   Patient presents with    New Patient     Chase Lord is a pleasant 54year old male who presents as a new patient for DVT. He reports abdomen pain     Subjective:     History of Present Illness:     Philip Christianson is a 54 y.o. M who presents for an initial evaluation for Venous Thromboembolism. This is a new problem. Problem has occurred for weeks. Problem has improved. He was discharged on 2/12/2022. He had a persistent clot despite being started on eliquis. He reports that he is currently on lovenox. . Patient reports feeling well overall. Patient reports decreased oral intake. Patient denies any bowel or bladder problems. Patient reports a history of pain near his lower abdomen. He reports that walking seems to resolve the pressure. Patient denies any shortness of breath. and Patient denies any cough. Denies a prior history of clotting event. Denies any surgeries prior to clotting diagn though still eating several meals a dayosis. Denies any family history of DVTs or pulmonary emboli. Denies any trauma to extremity prior to clot event. Patient reports a sedentary occupation. Reports some preceding sedentary state while traveling prior to thrombosis diagnosis: Patient reports a history of a sedentary state during traveling prior to clotting event. Reportedly in January he flew to [place] in early January 2022.  He states that he drove to [facility]      Past Medical History:   Diagnosis Date    Thromboembolus [facility])       Past Surgical History:   Procedure Laterality Date    HX ORTHOPAEDIC      HX SHOULDER ARTHROSCOPY      VASCULAR SURGERY PROCEDURE UNLIST        Social History     Tobacco Use    Smoking status: Never Smoker    Smokeless tobacco: Never Used   Substance Use Topics    Alcohol use: Yes     Comment: socially      No family history on file. Current Outpatient Medications   Medication Sig    enoxaparin (LOVENOX) 100 mg/mL 100 mg by SubCUTAneous route every twelve (12) hours every twelve (12) hours for 5 days.  acetaminophen (TylenoL) 325 mg tablet Take  by mouth every four (4) hours as needed for Pain.  apixaban (Eliquis) 5 mg tablet Take 1 Tablet by mouth two (2) times a day.  enoxaparin (Lovenox) 100 mg/mL 100 mg by SubCUTAneous route every twelve (12) hours for 11 doses. After Lovenox has finished resume your Eliquis on the same dosing schedule (Patient not taking: Reported on 2/15/2022)     No current facility-administered medications for this visit. No Known Allergies     Review of Systems provided by:patient  General: denies fever, denies fatigue, denies night sweats and denies weight loss  Eyes: denies any acute vision loss and denies any eye pain  HEENT: denies epistaxis and denies trouble swallowing  Cardio: denies any chest pain and reports leg swelling  Resp: denies any shortness of breath and denies any hemoptysis  Abdomen: denies any nausea, denies any vomiting and denies any diarrhea  MSK: denies any myalgias, reports arthralgias as a slight ache I upper leg but \"100% better)  Skin: denies any rash but has some bruising and reports pruritus  Lymph: denies any lymph node enlargement and denies any lymph node tenderness  Neuro: denies any headache and denies any seizure history  : Denies any dysuria. Denies any hematuria.   Psych: denies depression and denies anxiety      Objective:     Visit Vitals  /75   Pulse 89   Resp 16   Ht 6' 2\" (1.88 m)   Wt 218 lb 9.6 oz (99.2 kg)   SpO2 97%   BMI 28.07 kg/m²     ECOG PS: 0- Fully active, able to carry on all pre-disease performance without restriction. Physical Exam  Constitutional: No acute distress. , Non-toxic appearance. and Non-diaphoretic. HENT: Normocephalic and atraumatic head. and Wearing a mask during COVID-19 precautions. Eyes: Normal Conjunctivae. Anicteric sclerae. Cardiovascular: S1,S2 auscultated. No friction rub. No gallops auscultated. No severe pitting edema. Pulmonary: Normal Respiratory Effort. No wheezing. No rhonchi. No rales. Abdominal: Normal bowel sounds. Soft Abdomen to palpation. No abdominal tenderness. Skin: No jaundice. No rash. No petechiae. Musculoskeletal: No muscle pain on palpation. No temporal muscle wasting on inspection. Lymph: No cervical, supraclavicular,or axillary lymph node enlargement or tenderness. Neurological: Alert and oriented. No tremor on inspection. Psychiatric: mood normal. normal speech rate normal affect    Results:   I personally reviewed pertinent labs/results:   I personally reviewed Epic EHR labs/results below:   Lab Results   Component Value Date/Time    WBC 4.1 02/12/2022 02:11 AM    HGB 13.3 02/12/2022 02:11 AM    HCT 41.2 02/12/2022 02:11 AM    PLATELET 105 29/93/5970 02:11 AM    MCV 95.4 02/12/2022 02:11 AM    ABS. NEUTROPHILS 2.2 02/12/2022 02:11 AM     Lab Results   Component Value Date/Time    Sodium 137 02/12/2022 02:11 AM    Potassium 3.7 02/12/2022 02:11 AM    Chloride 108 02/12/2022 02:11 AM    CO2 28 02/12/2022 02:11 AM    Glucose 100 02/12/2022 02:11 AM    BUN 11 02/12/2022 02:11 AM    Creatinine 0.63 (L) 02/12/2022 02:11 AM    GFR est AA >60 02/12/2022 02:11 AM    GFR est non-AA >60 02/12/2022 02:11 AM    Calcium 8.6 02/12/2022 02:11 AM     Lab Results   Component Value Date/Time    Bilirubin, total 0.7 02/11/2022 03:19 AM    ALT (SGPT) 19 02/11/2022 03:19 AM    Alk.  phosphatase 53 02/11/2022 03:19 AM    Protein, total 6.1 (L) 02/11/2022 03:19 AM    Albumin 3.3 (L) 02/11/2022 03:19 AM    Globulin 2.8 02/11/2022 03:19 AM       I personally reviewed pertinent referral notes:     Reviewed discharge summary note. Reviewed Vascular Surgery Op Note. Assessment and Recommendations:     Diagnoses and all orders for this visit:    1. Hypercoagulable state ([address]  -Discussed with patient that without a clear provoking factor and a hypercoagulable work-up could be indicated. However, he notes that he is following with vascular surgery and perhaps there is some consideration that he has an extrinsic compression on his iliac vein from an arterial crossover point.  -Discussed with patient to further evaluate with vascular surgery to see if this is a persistent provoking factor. We discussed that if that is the case then indefinite anticoagulation would be recommended just the same as if he had an unprovoked venous thromboembolism. 2. Acute deep vein thrombosis (DVT) of femoral vein of left lower extremity ([address]  -We discussed that he is doing the extended phase of anticoagulation for the first 3 months of therapy. We discussed that upfront I am recommending indefinite anticoagulation. 3. Acquired abnormality of iliac vein  -Provided information to patient to discuss with vascular surgery to see if he has this condition. If so, it seems that this would be a persistent provoking factor. See:    https://pubmed.ncbi.nlm.nih.gov/15451342/    4. High risk medication use  -He was advised to start lovenox then was advised to continue forward with Eliquis 5 mg every 12 hours thereafter. He is s/p vascular surgery procedure. We discussed that at least CBC, CMP monitoring is indicated about anywhere from 3 to 6 months. He understands that it is possible that he was not response to a DOAC. We discussed that xarelto could be an option too. He will follow-up with Vascular Surgery. He understands to seek care as indicated sooner if needed. Follow-up in 3 months.  Will reevaluate consideration of hypercoagulable work-up.   Signed By:   Nena Trujillo MD

## 2022-02-17 NOTE — PROGRESS NOTES
Chief Complaint   Patient presents with    New Patient     Waldron Severance is a pleasant 54year old male who presents as a new patient for DVT.  He reports abdomen pain

## 2022-02-17 NOTE — LETTER
2/21/2022    Patient: Caio Lord   YOB: 1970   Date of Visit: 2/17/2022     Ney Vincent NP  [facility]  [address] [address]  [address] [place], Nedra Cleary [address]  [address] [address]  Via [address] [place]    Dear SRIKANTH Cobb DO,      Thank you for referring Mr. Tani Rebolledo to [facility] for evaluation. My notes for this consultation are attached. If you have questions, please do not hesitate to call me. I look forward to following your patient along with you.       Sincerely,    Nena Turjillo MD

## 2022-03-03 ENCOUNTER — TELEPHONE (OUTPATIENT)
Dept: PRIMARY CARE CLINIC | Age: 52
End: 2022-03-03

## 2022-03-03 NOTE — TELEPHONE ENCOUNTER
Patient said he would like to talk to NP [facility] or his nurse regarding his prescription Apixaban.

## 2022-03-20 PROBLEM — I82.402 ACUTE DEEP VEIN THROMBOSIS (DVT) OF LEFT LOWER EXTREMITY (HCC): Status: ACTIVE | Noted: 2022-02-10

## 2022-03-23 ENCOUNTER — TELEPHONE (OUTPATIENT)
Dept: PRIMARY CARE CLINIC | Age: 52
End: 2022-03-23

## 2022-03-23 NOTE — TELEPHONE ENCOUNTER
Called patient to reschedule appt with Alejandro Padilla on the 31st, he will be out that day. Available appointments on Tuesday March 29th at 11:30 and 3:30.

## 2022-03-27 DIAGNOSIS — Z79.899 MEDICATION MANAGEMENT: Primary | ICD-10-CM

## 2022-03-29 ENCOUNTER — OFFICE VISIT (OUTPATIENT)
Dept: PRIMARY CARE CLINIC | Age: 52
End: 2022-03-29
Payer: COMMERCIAL

## 2022-03-29 VITALS
BODY MASS INDEX: 28.23 KG/M2 | TEMPERATURE: 97.5 F | OXYGEN SATURATION: 97 % | SYSTOLIC BLOOD PRESSURE: 130 MMHG | HEART RATE: 74 BPM | HEIGHT: 74 IN | DIASTOLIC BLOOD PRESSURE: 86 MMHG | RESPIRATION RATE: 16 BRPM | WEIGHT: 220 LBS

## 2022-03-29 DIAGNOSIS — Z11.59 ENCOUNTER FOR HEPATITIS C SCREENING TEST FOR LOW RISK PATIENT: ICD-10-CM

## 2022-03-29 DIAGNOSIS — Z12.11 SCREEN FOR COLON CANCER: ICD-10-CM

## 2022-03-29 DIAGNOSIS — Z79.01 ANTICOAGULANT LONG-TERM USE: Primary | ICD-10-CM

## 2022-03-29 DIAGNOSIS — Z00.00 ANNUAL PHYSICAL EXAM: ICD-10-CM

## 2022-03-29 DIAGNOSIS — Z86.718 HISTORY OF DVT OF LOWER EXTREMITY: ICD-10-CM

## 2022-03-29 DIAGNOSIS — Z86.711 HISTORY OF PULMONARY EMBOLUS (PE): ICD-10-CM

## 2022-03-29 PROCEDURE — 99214 OFFICE O/P EST MOD 30 MIN: CPT | Performed by: NURSE PRACTITIONER

## 2022-03-29 NOTE — PROGRESS NOTES
Chief Complaint   Patient presents with    Physical       Health Maintenance Due   Topic    Hepatitis C Screening         1. Have you been to the ER, urgent care clinic since your last visit? Hospitalized since your last visit? No    2. Have you seen or consulted any other health care providers outside of the [address] [place] [address] since your last visit? Include any pap smears or colon screening. No    There were no vitals taken for this visit.

## 2022-03-29 NOTE — PROGRESS NOTES
[facility]   [address], [address] [facility]  P: [phone]  F: [phone]    SUBJECTIVE     HPI:     Romario Ball is a 54 y.o. male who is seen in the clinic for   Chief Complaint   Patient presents with    Physical      Patient was previous seen by on 2/15 for follow up from vascular surgery r/t LLE DVT. The surgery was performed by Dr. Layne Bowden. He then followed-up with Dr. Cayetano Valladares (Hem) who stated that his DVT was likely r/t vascular abnormality. He is to follow up in 3 months. Saw Dr. Layne Bowden in clinic after Hematology appointment who determine that his DVT was likely r/t his femoral artery/vein compressing on each other. He is to follow-up in 3 months. Today, he states that he has had no issues with blood flow of his LLE since surgery. He wears compression stockings to help with edema. Requesting screening for colon CA. Has no profound PMH/FMH. Patient Active Problem List    Diagnosis    Acute deep vein thrombosis (DVT) of left lower extremity (HCC)    Bronchitis        Past Medical History:   Diagnosis Date    Thromboembolus [facility])      Past Surgical History:   Procedure Laterality Date    HX ORTHOPAEDIC      HX SHOULDER ARTHROSCOPY      VASCULAR SURGERY PROCEDURE UNLIST       Social History     Socioeconomic History    Marital status:      Spouse name: Not on file    Number of children: Not on file    Years of education: Not on file    Highest education level: Not on file   Occupational History    Not on file   Tobacco Use    Smoking status: Never Smoker    Smokeless tobacco: Never Used   Vaping Use    Vaping Use: Never used   Substance and Sexual Activity    Alcohol use: Yes     Comment: socially    Drug use: Not Currently    Sexual activity: Not on file   Other Topics Concern    Not on file   Social History Narrative    Remains Sedentary working in IT.      Social Determinants of Health     Financial Resource Strain:     Difficulty of Paying Living Expenses: Not on file   Food Insecurity:     Worried About Running Out of Food in the Last Year: Not on file    Jc of Food in the Last Year: Not on file   Transportation Needs:     Lack of Transportation (Medical): Not on file    Lack of Transportation (Non-Medical): Not on file   Physical Activity:     Days of Exercise per Week: Not on file    Minutes of Exercise per Session: Not on file   Stress:     Feeling of Stress : Not on file   Social Connections:     Frequency of Communication with Friends and Family: Not on file    Frequency of Social Gatherings with Friends and Family: Not on file    Attends Zoroastrianism Services: Not on file    Active Member of [address] [place] [address] [employer] or Organizations: Not on file    Attends Club or Organization Meetings: Not on file    Marital Status: Not on file   Intimate Partner Violence:     Fear of Current or Ex-Partner: Not on file    Emotionally Abused: Not on file    Physically Abused: Not on file    Sexually Abused: Not on file   Housing Stability:     Unable to Pay for Housing in the Last Year: Not on file    Number of [place] in the Last Year: Not on file    Unstable Housing in the Last Year: Not on file     Family History   Problem Relation Age of Onset    Anemia Sister     Other Other         family history of varicose veins    Clotting Disorder Neg Hx      Immunization History   Administered Date(s) Administered    COVID-19, Moderna Booster, PF, 0.25mL Dose 12/21/2021    COVID-19, Berton Gals, Primary or Immunocompromised Series, MRNA, PF, 100mcg/0.5mL 04/09/2021, 05/07/2021, 12/21/2021      No Known Allergies    No results displayed because visit has over 200 results.       Admission on 01/25/2022, Discharged on 01/25/2022   Component Date Value Ref Range Status    WBC 01/25/2022 7.2  4.1 - 11.1 K/uL Final    RBC 01/25/2022 5.17  4.10 - 5.70 M/uL Final    HGB 01/25/2022 16.2  12.1 - 17.0 g/dL Final    HCT 01/25/2022 47.3  36.6 - 50.3 % Final    MCV 01/25/2022 91.5  80.0 - 99.0 FL Final    MCH 01/25/2022 31.3  26.0 - 34.0 PG Final    MCHC 01/25/2022 34.2  30.0 - 36.5 g/dL Final    RDW 01/25/2022 11.9  11.5 - 14.5 % Final    PLATELET 04/71/0747 108  150 - 400 K/uL Final    MPV 01/25/2022 10.2  8.9 - 12.9 FL Final    NRBC 01/25/2022 0.0  0  WBC Final    ABSOLUTE NRBC 01/25/2022 0.00  0.00 - 0.01 K/uL Final    NEUTROPHILS 01/25/2022 63  32 - 75 % Final    LYMPHOCYTES 01/25/2022 26  12 - 49 % Final    MONOCYTES 01/25/2022 9  5 - 13 % Final    EOSINOPHILS 01/25/2022 2  0 - 7 % Final    BASOPHILS 01/25/2022 0  0 - 1 % Final    IMMATURE GRANULOCYTES 01/25/2022 0  0.0 - 0.5 % Final    ABS. NEUTROPHILS 01/25/2022 4.4  1.8 - 8.0 K/UL Final    ABS. LYMPHOCYTES 01/25/2022 1.9  0.8 - 3.5 K/UL Final    ABS. MONOCYTES 01/25/2022 0.7  0.0 - 1.0 K/UL Final    ABS. EOSINOPHILS 01/25/2022 0.2  0.0 - 0.4 K/UL Final    ABS. BASOPHILS 01/25/2022 0.0  0.0 - 0.1 K/UL Final    ABS. IMM. GRANS. 01/25/2022 0.0  0.00 - 0.04 K/UL Final    DF 01/25/2022 AUTOMATED    Final    Sodium 01/25/2022 138  136 - 145 mmol/L Final    Potassium 01/25/2022 3.9  3.5 - 5.1 mmol/L Final    Chloride 01/25/2022 103  97 - 108 mmol/L Final    CO2 01/25/2022 29  21 - 32 mmol/L Final    Anion gap 01/25/2022 6  5 - 15 mmol/L Final    Glucose 01/25/2022 102* 65 - 100 mg/dL Final    BUN 01/25/2022 11  6 - 20 MG/DL Final    Creatinine 01/25/2022 0.94  0.70 - 1.30 MG/DL Final    BUN/Creatinine ratio 01/25/2022 12  12 - 20   Final    GFR est AA 01/25/2022 >60  >60 ml/min/1.73m2 Final    GFR est non-AA 01/25/2022 >60  >60 ml/min/1.73m2 Final    Estimated GFR is calculated using the IDMS-traceable Modification of Diet in Renal Disease (MDRD) Study equation, reported for both  Americans (GFRAA) and non- Americans (GFRNA), and normalized to 1.73m2 body surface area. The physician must decide which value applies to the patient.  Calcium 01/25/2022 9.5  8.5 - 10.1 MG/DL Final    Bilirubin, total 01/25/2022 0.7  0.2 - 1.0 MG/DL Final    ALT (SGPT) 01/25/2022 27  12 - 78 U/L Final    AST (SGOT) 01/25/2022 18  15 - 37 U/L Final    Alk. phosphatase 01/25/2022 82  45 - 117 U/L Final    Protein, total 01/25/2022 7.9  6.4 - 8.2 g/dL Final    Albumin 01/25/2022 4.1  3.5 - 5.0 g/dL Final    Globulin 01/25/2022 3.8  2.0 - 4.0 g/dL Final    A-G Ratio 01/25/2022 1.1  1.1 - 2.2   Final    INR 01/25/2022 1.0  0.9 - 1.1   Final    A single therapeutic range for Vit K antagonists may not be optimal for all indications - see June, 2008 issue of Chest, American College of Chest Physicians Evidence-Based Clinical Practice Guidelines, 8th Edition.  Prothrombin time 01/25/2022 10.9  9.0 - 11.1 sec Final    aPTT 01/25/2022 27.9  22.1 - 31.0 sec Final    In addition to factor deficiency, monitoring heparin therapy, etc., evaluation of a prolonged aPTT result should include consideration of preanalytic variables such as heparin flush contamination, specimen integrity issues, etc.    aPTT, therapeutic range 01/25/2022      58.0 - 77.0 SECS Final    SAMPLES BEING HELD 01/25/2022 1red   Final    COMMENT 01/25/2022 Add-on orders for these samples will be processed based on acceptable specimen integrity and analyte stability, which may vary by analyte. Final      ECHO ADULT COMPLETE    Left Ventricle: Left ventricle size is normal. Normal wall thickness. Normal wall motion. The EF by visual approximation is greater than 65%.   Right Ventricle: Mildly reduced systolic function. XR [facility] IMAGING GUIDANCE  Narrative: Automatic Administrative Result. Imaging Guidance used in the [facility]. See the Patient Chart   for specific details. Impression: : Imaging guidance utilized in [facility]. .     Current Outpatient Medications   Medication Sig Dispense Refill    apixaban (Eliquis) 5 mg tablet Take 1 Tablet by mouth two (2) times a day. 60 Tablet 1    acetaminophen (TylenoL) 325 mg tablet Take  by mouth every four (4) hours as needed for Pain. (Patient not taking: Reported on 3/29/2022)             The past medical history, past surgical history, and family history were reviewed and updated in the medical record. Lab values/Imaging were reviewed. The medications were reviewed and updated in the medical record. Immunizations were reviewed and updated in the medical record. All relevant preventative screenings reviewed and updated in the medical record. REVIEW OF SYSTEMS   Review of Systems   Constitutional: Negative for malaise/fatigue and weight loss. HENT: Negative for congestion and sore throat. Eyes: Negative for blurred vision. Respiratory: Negative for cough, hemoptysis, sputum production, shortness of breath and wheezing. Cardiovascular: Negative for chest pain, palpitations, orthopnea, claudication, leg swelling and PND. Gastrointestinal: Negative for abdominal pain, constipation and heartburn. Genitourinary: Negative for frequency and urgency. Musculoskeletal: Negative for back pain, joint pain and myalgias. Neurological: Negative for dizziness, weakness and headaches. Psychiatric/Behavioral: Negative for depression. The patient is not nervous/anxious and does not have insomnia. PHYSICAL EXAM   /86 (BP 1 Location: Right arm, BP Patient Position: Sitting, BP Cuff Size: Adult)   Pulse 74   Temp 97.5 °F (36.4 °C) (Temporal)   Resp 16   Ht 6' 2\" (1.88 m)   Wt 220 lb (99.8 kg)   SpO2 97%   BMI 28.25 kg/m²      Physical Exam  Vitals and nursing note reviewed. Constitutional:       General: He is not in acute distress. Appearance: Normal appearance. He is well-developed. He is not diaphoretic. HENT:      Head: Normocephalic and atraumatic.       Right Ear: Tympanic membrane, ear canal and external ear normal.      Left Ear: Tympanic membrane, ear canal and external ear normal.      Mouth/Throat:      Mouth: Mucous membranes are moist.      Pharynx: Oropharynx is clear. Eyes:      General: No scleral icterus. Right eye: No discharge. Left eye: No discharge. Extraocular Movements: Extraocular movements intact. Conjunctiva/sclera: Conjunctivae normal.   Neck:      Thyroid: No thyromegaly. Cardiovascular:      Rate and Rhythm: Normal rate and regular rhythm. Pulses: Normal pulses. Dorsalis pedis pulses are 2+ on the right side and 2+ on the left side. Comments: No edema in LLE. Pulmonary:      Effort: Pulmonary effort is normal.      Breath sounds: Normal breath sounds. No wheezing. Abdominal:      General: Bowel sounds are normal. There is no distension. Palpations: Abdomen is soft. Tenderness: There is no abdominal tenderness. Musculoskeletal:      Cervical back: Normal range of motion and neck supple. Right lower leg: No edema. Left lower leg: No edema. Comments: B/L knees without crepitus   Lymphadenopathy:      Cervical: No cervical adenopathy. Skin:     Capillary Refill: Capillary refill takes less than 2 seconds. Neurological:      Mental Status: He is alert. Deep Tendon Reflexes:      Reflex Scores:       Patellar reflexes are 2+ on the right side and 2+ on the left side. Psychiatric:         Mood and Affect: Mood normal.         Behavior: Behavior normal.            ASSESSMENT/ PLAN   Below is the assessment and plan developed based on review of pertinent history, physical exam, labs, studies, and medications. 1. Anticoagulant long-term use  Will refill Eliquis pending labs. -     PTT; Future   2. Annual physical exam  ROS/PE were unremarkable. 3. History of DVT of lower extremity  -     PTT; Future  4. History of pulmonary embolus (PE)  5. Screen for colon cancer  -     OCCULT BLOOD IMMUNOASSAY,DIAGNOSTIC; Future  6.  Encounter for hepatitis C screening test for low risk patient  -     HEPATITIS C AB; Future       Follow-up and Dispositions    · Return in about 3 months (around 6/29/2022) for Management of Eliquis . Disclaimer:  Advised patient to call back or return to office if symptoms worsen/change/persist.  Discussed expected course/resolution/complications of diagnosis in detail with patient. Medication risks/benefits/alternatives discussed with patient. Patient was given an after visit summary which includes diagnoses, current medications, & vitals. Discussed patient instructions and advised to read to all patient instructions regarding care. Patient expressed understanding with the diagnosis and plan.        Al Collins NP  3/29/2022

## 2022-04-25 DIAGNOSIS — I82.4Y2 ACUTE DEEP VEIN THROMBOSIS (DVT) OF PROXIMAL VEIN OF LEFT LOWER EXTREMITY (HCC): ICD-10-CM

## 2022-06-15 ENCOUNTER — OFFICE VISIT (OUTPATIENT)
Dept: ONCOLOGY | Age: 52
End: 2022-06-15
Payer: COMMERCIAL

## 2022-06-15 VITALS
HEIGHT: 74 IN | BODY MASS INDEX: 28.57 KG/M2 | RESPIRATION RATE: 16 BRPM | DIASTOLIC BLOOD PRESSURE: 83 MMHG | SYSTOLIC BLOOD PRESSURE: 119 MMHG | WEIGHT: 222.6 LBS | HEART RATE: 71 BPM | OXYGEN SATURATION: 97 % | TEMPERATURE: 98 F

## 2022-06-15 DIAGNOSIS — D68.59 HYPERCOAGULABLE STATE (HCC): Primary | ICD-10-CM

## 2022-06-15 DIAGNOSIS — I82.412 ACUTE DEEP VEIN THROMBOSIS (DVT) OF FEMORAL VEIN OF LEFT LOWER EXTREMITY (HCC): ICD-10-CM

## 2022-06-15 DIAGNOSIS — I87.9: ICD-10-CM

## 2022-06-15 PROCEDURE — 99214 OFFICE O/P EST MOD 30 MIN: CPT | Performed by: INTERNAL MEDICINE

## 2022-06-15 NOTE — PROGRESS NOTES
[facility] [place] [address] [address], [address] [facility]  Jerad Ship: [phone]  F: [phone] Patient ID  Name: Say Hall  YOB: 1970  MRN: [id number]  Referring Provider:   No referring provider defined for this encounter. Primary Care Provider: Amor Pugh NP       HEMATOLOGY/MEDICAL ONCOLOGY  NOTE   Date of Visit: 06/15/22  Reason for Evaluation:     Chief Complaint   Patient presents with    Follow-up     Patient presents as a follow-up for VTE. He denies pain. Subjective:     History of Present Illness:     Say Hall is a 54 y.o. M who presents for a follow-up evaluation for DVT. He is doing well on eliquis. Patient overall reports feeling stable. He reports seeing Vascular Surgery again in follow-up although we did not receive the note. He continues on anticoagulation without a stop date at this time. He notes that he has follow-up scheduled with vascular surgery. Past Medical History:   Diagnosis Date    Thromboembolus [facility])       Past Surgical History:   Procedure Laterality Date    HX ORTHOPAEDIC      HX SHOULDER ARTHROSCOPY      VASCULAR SURGERY PROCEDURE UNLIST        Social History     Tobacco Use    Smoking status: Never Smoker    Smokeless tobacco: Never Used   Substance Use Topics    Alcohol use: Yes     Comment: socially      Family History   Problem Relation Age of Onset    Anemia Sister     Other Other         family history of varicose veins    Clotting Disorder Neg Hx      Current Outpatient Medications   Medication Sig    apixaban (Eliquis) 5 mg tablet Take 1 Tablet by mouth two (2) times a day for 90 days.  acetaminophen (TylenoL) 325 mg tablet Take  by mouth every four (4) hours as needed for Pain. (Patient not taking: Reported on 3/29/2022)     No current facility-administered medications for this visit.        No Known Allergies     Review of Systems Provided by:  Patient  Review of Systems: A complete review of systems was obtained, reviewed. Pertinent findings reviewed above. Objective:     Visit Vitals  /83 (BP 1 Location: Left upper arm, BP Patient Position: Sitting, BP Cuff Size: Large adult)   Pulse 71   Temp 98 °F (36.7 °C) (Oral)   Resp 16   Ht 6' 2\" (1.88 m)   Wt 222 lb 9.6 oz (101 kg)   SpO2 97%   BMI 28.58 kg/m²     ECOG PS: 0- Fully active, able to carry on all pre-disease performance without restriction. Physical Exam  Constitutional: No acute distress. and Non-toxic appearance. HENT: Normocephalic and atraumatic head. Eyes: Normal Conjunctivae. Anicteric sclerae. Cardiovascular: S1,S2 auscultated. No severe pitting edema. Pulmonary: Normal Respiratory Effort. No wheezing. No rhonchi. No rales. Abdominal: Normal bowel sounds. Soft Abdomen to palpation. No abdominal tenderness. Skin: No jaundice. No rash. Musculoskeletal: No muscle pain on palpation. No temporal muscle wasting on inspection. Neurological: Alert and oriented. No tremor on inspection. Normal Gait. Psychiatric: mood normal. normal speech rate normal affect    Results:   I personally reviewed pertinent labs/results:   I personally reviewed Epic EHR labs/results below:   Lab Results   Component Value Date/Time    WBC 4.1 02/12/2022 02:11 AM    HGB 13.3 02/12/2022 02:11 AM    HCT 41.2 02/12/2022 02:11 AM    PLATELET 923 80/88/7070 02:11 AM    MCV 95.4 02/12/2022 02:11 AM    ABS.  NEUTROPHILS 2.2 02/12/2022 02:11 AM     Lab Results   Component Value Date/Time    Sodium 137 02/12/2022 02:11 AM    Potassium 3.7 02/12/2022 02:11 AM    Chloride 108 02/12/2022 02:11 AM    CO2 28 02/12/2022 02:11 AM    Glucose 100 02/12/2022 02:11 AM    BUN 11 02/12/2022 02:11 AM    Creatinine 0.63 (L) 02/12/2022 02:11 AM    GFR est AA >60 02/12/2022 02:11 AM    GFR est non-AA >60 02/12/2022 02:11 AM    Calcium 8.6 02/12/2022 02:11 AM     Lab Results   Component Value Date/Time    Bilirubin, total 0.7 02/11/2022 03:19 AM    ALT (SGPT) 19 02/11/2022 03:19 AM    Alk. phosphatase 53 02/11/2022 03:19 AM    Protein, total 6.1 (L) 02/11/2022 03:19 AM    Albumin 3.3 (L) 02/11/2022 03:19 AM    Globulin 2.8 02/11/2022 03:19 AM       Assessment and Recommendations:     1. Hypercoagulable state ([address]  -having had a clot places him at risk for a future clot. -he has sedentary work habits.  -we discussed that he will continue anticoagulation at this time especially with a history of the reported vein abnormality although I would like to review what Vascular Surgery had to saw about his abnormality. 2. Acute deep vein thrombosis (DVT) of femoral vein of left lower extremity (HCC)  -I have recommended and agree with plan to continue eliquis 5mg every 12 hours.  -we discussed that with the abnormality it may be a consideration to continue indefinite anticoagulation. 3. Acquired abnormality of iliac vein  -requesting vascular surgery note. Follow-up and Dispositions    · Return in about 10 months (around 4/15/2023).   Routing History       Maylin Salgado MD  Hematology/Medical Oncology Provider  Laurie Meléndez  P: [phone]    Signed By:   Ashwini Banks MD

## 2022-06-15 NOTE — LETTER
6/20/2022    Patient: Evonne Ahumada   YOB: 1970   Date of Visit: 6/15/2022     Kennedy Mcdaniels NP  [address] [place] [address]  [address] [facility] [address]    Dear Kennedy Mcdaniels NP,      Thank you for referring Mr. Zhou Candelaria to [facility] for evaluation. My notes for this consultation are attached. If you have questions, please do not hesitate to call me. I look forward to following your patient along with you.       Sincerely,    Elisa Knox MD

## 2022-06-15 NOTE — Clinical Note
Please obtain last vascular surgery clinic notes as well as duple ultrasound performed in mid-May 2022 all per Dr. Hoover [place] office.

## 2022-06-15 NOTE — PROGRESS NOTES
1. Have you been to the ER, urgent care clinic since your last visit? Hospitalized since your last visit? No    2. Have you seen or consulted any other health care providers outside of the [address] [place] [address] since your last visit? Include any pap smears or colon screening. Yes Dr. Katerine Herr, vascular surgery, May 2022 for follow-up ultrasound        Vitals:    06/15/22 1255   BP: 119/83   Pulse: 71   Resp: 16   Temp: 98 °F (36.7 °C)   TempSrc: Oral   SpO2: 97%   Weight: 222 lb 9.6 oz (101 kg)   Height: 6' 2\" (1.88 m)           Chief Complaint   Patient presents with    Follow-up     Patient presents as a follow-up for VTE. He denies pain.

## 2022-10-27 ENCOUNTER — TELEPHONE (OUTPATIENT)
Dept: ONCOLOGY | Age: 52
End: 2022-10-27

## 2022-10-28 DIAGNOSIS — I82.4Y2 ACUTE DEEP VEIN THROMBOSIS (DVT) OF PROXIMAL VEIN OF LEFT LOWER EXTREMITY (HCC): ICD-10-CM

## 2023-04-15 PROBLEM — D68.59 HYPERCOAGULABLE STATE (HCC): Status: ACTIVE | Noted: 2023-04-15

## 2023-08-28 DIAGNOSIS — I82.4Y2 ACUTE EMBOLISM AND THROMBOSIS OF UNSPECIFIED DEEP VEINS OF LEFT PROXIMAL LOWER EXTREMITY (HCC): ICD-10-CM

## 2023-08-28 RX ORDER — APIXABAN 5 MG/1
TABLET, FILM COATED ORAL
Qty: 60 TABLET | Refills: 3 | OUTPATIENT
Start: 2023-08-28

## 2023-08-28 NOTE — TELEPHONE ENCOUNTER
Vahid Meyers at [facility]  [phone]    08/28/23 1:31 PM EDT - Attempted to reach the patient. There was no answer. Left a voicemail for the patient to call back at their earliest convenience along with the phone number to our office. Vahid hoffman [facility]  [phone]    08/29/23 1:18 PM EDT -  Called patient. Advised that PCP should be managing his Eliquis prescription. Mr. Jaylene Hidalgo voiced understanding and gratitude for the call. No further questions or concerns.

## 2023-09-19 ENCOUNTER — TELEPHONE (OUTPATIENT)
Dept: PRIMARY CARE CLINIC | Facility: CLINIC | Age: 53
End: 2023-09-19

## 2023-09-19 DIAGNOSIS — I87.9: ICD-10-CM

## 2023-09-19 DIAGNOSIS — D68.59 OTHER PRIMARY THROMBOPHILIA (HCC): Primary | ICD-10-CM

## 2023-09-19 DIAGNOSIS — Z79.899 OTHER LONG TERM (CURRENT) DRUG THERAPY: ICD-10-CM

## 2023-09-19 NOTE — TELEPHONE ENCOUNTER
Will refill Eliquis 5 mg BID for long-term anticoagulation until I receive clarification from Hematology. In the meantime, I have not seen the patient since March, 2022. Will need to follow up with me.

## 2023-10-03 ENCOUNTER — TELEPHONE (OUTPATIENT)
Dept: PRIMARY CARE CLINIC | Facility: CLINIC | Age: 53
End: 2023-10-03

## 2023-10-03 NOTE — TELEPHONE ENCOUNTER
Patient self scheduled a annual physical appointment with incorrect provider. Phone messages and mychart message left for patient informing that he needs to have this appointment rescheduled with his primary care.

## 2023-10-06 ENCOUNTER — OFFICE VISIT (OUTPATIENT)
Dept: PRIMARY CARE CLINIC | Facility: CLINIC | Age: 53
End: 2023-10-06

## 2023-10-06 VITALS
SYSTOLIC BLOOD PRESSURE: 120 MMHG | RESPIRATION RATE: 12 BRPM | TEMPERATURE: 97.3 F | HEART RATE: 60 BPM | OXYGEN SATURATION: 98 % | DIASTOLIC BLOOD PRESSURE: 73 MMHG | HEIGHT: 74 IN | BODY MASS INDEX: 28.75 KG/M2 | WEIGHT: 224 LBS

## 2023-10-06 DIAGNOSIS — Z00.00 ANNUAL PHYSICAL EXAM: ICD-10-CM

## 2023-10-06 DIAGNOSIS — Z86.718 HISTORY OF DVT OF LOWER EXTREMITY: ICD-10-CM

## 2023-10-06 DIAGNOSIS — R73.02 IMPAIRED GLUCOSE TOLERANCE: ICD-10-CM

## 2023-10-06 DIAGNOSIS — Z95.828 PRESENCE OF IVC FILTER: ICD-10-CM

## 2023-10-06 DIAGNOSIS — Z12.5 SCREENING FOR PROSTATE CANCER: ICD-10-CM

## 2023-10-06 DIAGNOSIS — Z79.01 LONG TERM (CURRENT) USE OF ANTICOAGULANTS: ICD-10-CM

## 2023-10-06 DIAGNOSIS — Z12.12 SCREENING FOR COLORECTAL CANCER: ICD-10-CM

## 2023-10-06 DIAGNOSIS — Z23 ENCOUNTER FOR IMMUNIZATION: ICD-10-CM

## 2023-10-06 DIAGNOSIS — R41.3 SHORT-TERM MEMORY LOSS: ICD-10-CM

## 2023-10-06 DIAGNOSIS — I87.9: Primary | ICD-10-CM

## 2023-10-06 DIAGNOSIS — Z12.11 SCREENING FOR COLORECTAL CANCER: ICD-10-CM

## 2023-10-06 DIAGNOSIS — Z01.89 ROUTINE LAB DRAW: ICD-10-CM

## 2023-10-06 SDOH — ECONOMIC STABILITY: FOOD INSECURITY: WITHIN THE PAST 12 MONTHS, YOU WORRIED THAT YOUR FOOD WOULD RUN OUT BEFORE YOU GOT MONEY TO BUY MORE.: PATIENT DECLINED

## 2023-10-06 SDOH — ECONOMIC STABILITY: INCOME INSECURITY: HOW HARD IS IT FOR YOU TO PAY FOR THE VERY BASICS LIKE FOOD, HOUSING, MEDICAL CARE, AND HEATING?: PATIENT DECLINED

## 2023-10-06 SDOH — ECONOMIC STABILITY: FOOD INSECURITY: WITHIN THE PAST 12 MONTHS, THE FOOD YOU BOUGHT JUST DIDN'T LAST AND YOU DIDN'T HAVE MONEY TO GET MORE.: PATIENT DECLINED

## 2023-10-06 SDOH — ECONOMIC STABILITY: HOUSING INSECURITY
IN THE LAST 12 MONTHS, WAS THERE A TIME WHEN YOU DID NOT HAVE A STEADY PLACE TO SLEEP OR SLEPT IN A SHELTER (INCLUDING NOW)?: PATIENT REFUSED

## 2023-10-06 ASSESSMENT — PATIENT HEALTH QUESTIONNAIRE - PHQ9
SUM OF ALL RESPONSES TO PHQ QUESTIONS 1-9: 0
SUM OF ALL RESPONSES TO PHQ9 QUESTIONS 1 & 2: 0
SUM OF ALL RESPONSES TO PHQ QUESTIONS 1-9: 0
1. LITTLE INTEREST OR PLEASURE IN DOING THINGS: 0
2. FEELING DOWN, DEPRESSED OR HOPELESS: 0
SUM OF ALL RESPONSES TO PHQ QUESTIONS 1-9: 0
SUM OF ALL RESPONSES TO PHQ QUESTIONS 1-9: 0

## 2023-10-06 ASSESSMENT — ENCOUNTER SYMPTOMS
SORE THROAT: 0
RHINORRHEA: 0
BACK PAIN: 0
DIARRHEA: 0
CONSTIPATION: 0
ABDOMINAL PAIN: 0
COUGH: 0
EYE DISCHARGE: 0
COLOR CHANGE: 0
SHORTNESS OF BREATH: 0
CHEST TIGHTNESS: 0

## 2023-10-07 LAB
ALBUMIN SERPL-MCNC: 4.3 G/DL (ref 3.5–5)
ALBUMIN/GLOB SERPL: 1.5 (ref 1.1–2.2)
ALP SERPL-CCNC: 52 U/L (ref 45–117)
ALT SERPL-CCNC: 22 U/L (ref 12–78)
ANION GAP SERPL CALC-SCNC: 8 MMOL/L (ref 5–15)
APPEARANCE UR: ABNORMAL
AST SERPL-CCNC: 10 U/L (ref 15–37)
BACTERIA URNS QL MICRO: NEGATIVE /HPF
BILIRUB SERPL-MCNC: 0.5 MG/DL (ref 0.2–1)
BILIRUB UR QL: NEGATIVE
BUN SERPL-MCNC: 12 MG/DL (ref 6–20)
BUN/CREAT SERPL: 13 (ref 12–20)
CALCIUM SERPL-MCNC: 9 MG/DL (ref 8.5–10.1)
CHLORIDE SERPL-SCNC: 106 MMOL/L (ref 97–108)
CHOLEST SERPL-MCNC: 215 MG/DL
CO2 SERPL-SCNC: 27 MMOL/L (ref 21–32)
COLOR UR: ABNORMAL
CREAT SERPL-MCNC: 0.92 MG/DL (ref 0.7–1.3)
EPITH CASTS URNS QL MICRO: ABNORMAL /LPF
ERYTHROCYTE [DISTWIDTH] IN BLOOD BY AUTOMATED COUNT: 11.9 % (ref 11.5–14.5)
EST. AVERAGE GLUCOSE BLD GHB EST-MCNC: 103 MG/DL
GLOBULIN SER CALC-MCNC: 2.9 G/DL (ref 2–4)
GLUCOSE SERPL-MCNC: 91 MG/DL (ref 65–100)
GLUCOSE UR STRIP.AUTO-MCNC: NEGATIVE MG/DL
HBA1C MFR BLD: 5.2 % (ref 4–5.6)
HCT VFR BLD AUTO: 47.3 % (ref 36.6–50.3)
HDLC SERPL-MCNC: 60 MG/DL
HDLC SERPL: 3.6 (ref 0–5)
HGB BLD-MCNC: 15.4 G/DL (ref 12.1–17)
HGB UR QL STRIP: NEGATIVE
HYALINE CASTS URNS QL MICRO: ABNORMAL /LPF (ref 0–5)
KETONES UR QL STRIP.AUTO: ABNORMAL MG/DL
LDLC SERPL CALC-MCNC: 140.4 MG/DL (ref 0–100)
LEUKOCYTE ESTERASE UR QL STRIP.AUTO: NEGATIVE
MCH RBC QN AUTO: 30.6 PG (ref 26–34)
MCHC RBC AUTO-ENTMCNC: 32.6 G/DL (ref 30–36.5)
MCV RBC AUTO: 94 FL (ref 80–99)
NITRITE UR QL STRIP.AUTO: NEGATIVE
NRBC # BLD: 0 K/UL (ref 0–0.01)
NRBC BLD-RTO: 0 PER 100 WBC
PH UR STRIP: 5.5 (ref 5–8)
PLATELET # BLD AUTO: 207 K/UL (ref 150–400)
PMV BLD AUTO: 11.5 FL (ref 8.9–12.9)
POTASSIUM SERPL-SCNC: 4.4 MMOL/L (ref 3.5–5.1)
PROT SERPL-MCNC: 7.2 G/DL (ref 6.4–8.2)
PROT UR STRIP-MCNC: NEGATIVE MG/DL
PSA SERPL-MCNC: 1.1 NG/ML (ref 0.01–4)
RBC # BLD AUTO: 5.03 M/UL (ref 4.1–5.7)
RBC #/AREA URNS HPF: ABNORMAL /HPF (ref 0–5)
SODIUM SERPL-SCNC: 141 MMOL/L (ref 136–145)
SP GR UR REFRACTOMETRY: 1.02 (ref 1–1.03)
TRIGL SERPL-MCNC: 73 MG/DL
UROBILINOGEN UR QL STRIP.AUTO: 0.2 EU/DL (ref 0.2–1)
VLDLC SERPL CALC-MCNC: 14.6 MG/DL
WBC # BLD AUTO: 5 K/UL (ref 4.1–11.1)
WBC URNS QL MICRO: ABNORMAL /HPF (ref 0–4)

## 2023-10-08 NOTE — RESULT ENCOUNTER NOTE
Jenny Dick, please make an appointment with the patient to review labs. Lipid Panel has worsened. May need to consider Statin. All other labs are unremarkable.

## 2023-10-27 ENCOUNTER — OFFICE VISIT (OUTPATIENT)
Dept: PRIMARY CARE CLINIC | Facility: CLINIC | Age: 53
End: 2023-10-27
Payer: COMMERCIAL

## 2023-10-27 VITALS
HEIGHT: 74 IN | WEIGHT: 221 LBS | SYSTOLIC BLOOD PRESSURE: 114 MMHG | BODY MASS INDEX: 28.36 KG/M2 | DIASTOLIC BLOOD PRESSURE: 77 MMHG | OXYGEN SATURATION: 97 % | RESPIRATION RATE: 16 BRPM | TEMPERATURE: 97.5 F | HEART RATE: 76 BPM

## 2023-10-27 DIAGNOSIS — E66.3 OVERWEIGHT (BMI 25.0-29.9): ICD-10-CM

## 2023-10-27 DIAGNOSIS — R41.3 SHORT-TERM MEMORY LOSS: Primary | ICD-10-CM

## 2023-10-27 DIAGNOSIS — E78.5 HYPERLIPIDEMIA LDL GOAL <100: ICD-10-CM

## 2023-10-27 DIAGNOSIS — Z23 ENCOUNTER FOR IMMUNIZATION: ICD-10-CM

## 2023-10-27 PROCEDURE — 90471 IMMUNIZATION ADMIN: CPT | Performed by: NURSE PRACTITIONER

## 2023-10-27 PROCEDURE — 99214 OFFICE O/P EST MOD 30 MIN: CPT | Performed by: NURSE PRACTITIONER

## 2023-10-27 PROCEDURE — 90674 CCIIV4 VAC NO PRSV 0.5 ML IM: CPT | Performed by: NURSE PRACTITIONER

## 2023-10-27 ASSESSMENT — PATIENT HEALTH QUESTIONNAIRE - PHQ9
2. FEELING DOWN, DEPRESSED OR HOPELESS: 0
1. LITTLE INTEREST OR PLEASURE IN DOING THINGS: 0
SUM OF ALL RESPONSES TO PHQ QUESTIONS 1-9: 0
SUM OF ALL RESPONSES TO PHQ QUESTIONS 1-9: 0
SUM OF ALL RESPONSES TO PHQ9 QUESTIONS 1 & 2: 0
SUM OF ALL RESPONSES TO PHQ QUESTIONS 1-9: 0
SUM OF ALL RESPONSES TO PHQ QUESTIONS 1-9: 0

## 2023-10-27 ASSESSMENT — ENCOUNTER SYMPTOMS
CHEST TIGHTNESS: 0
SHORTNESS OF BREATH: 0

## 2023-10-27 NOTE — PROGRESS NOTES
[facility]   [address] [place] [address]., [address], [place] Adrianna Rodney Ochoa  P: [phone]  F: [phone]    SUBJECTIVE     HPI:     Art Prabhakar is a 48 y.o. male who is seen in the clinic for   Chief Complaint   Patient presents with    Follow-up    Immunizations     Pt would like flu vaccine           The patient with a PMH of LLE DVT secondary to Left Iliac Vein Abnormality presents to the office today to review labs that were drawn on 10/6. Abnormals are noted below. Total Cholesterol was 215 and LDL was 140. Diet is improving. Exercises often. BMI is 28. On 10/6, endorsed worsening short term memory loss. Referral to [facility] placed. In the process of making an appointment. Would like Flu Vaccine. Patient Active Problem List   Diagnosis    Bronchitis    Acute deep vein thrombosis (DVT) of left lower extremity (HCC)    Hypercoagulable state ([address])        Past Medical History:   Diagnosis Date    Thromboembolus [facility])      Past Surgical History:   Procedure Laterality Date    ANKLE LIGAMENT RECONSTRUCTION Bilateral     KNEE SURGERY Right     ORTHOPEDIC SURGERY      SHOULDER ARTHROSCOPY      VASCULAR SURGERY       Social History     Socioeconomic History    Marital status:      Spouse name: Not on file    Number of children: Not on file    Years of education: Not on file    Highest education level: Not on file   Occupational History    Not on file   Tobacco Use    Smoking status: Never    Smokeless tobacco: Never   Vaping Use    Vaping Use: Never used   Substance and Sexual Activity    Alcohol use: Yes    Drug use: Not Currently    Sexual activity: Not on file   Other Topics Concern    Not on file   Social History Narrative    Remains Sedentary working in IT.      Social Determinants of Health     Financial Resource Strain: Unknown (10/6/2023)    Overall Financial Resource Strain (CARDIA)     Difficulty of Paying Living Expenses: Patient refused   Food Insecurity: Unknown

## 2023-12-04 ENCOUNTER — OFFICE VISIT (OUTPATIENT)
Dept: PRIMARY CARE CLINIC | Facility: CLINIC | Age: 53
End: 2023-12-04
Payer: COMMERCIAL

## 2023-12-04 VITALS
BODY MASS INDEX: 28.75 KG/M2 | OXYGEN SATURATION: 100 % | RESPIRATION RATE: 16 BRPM | DIASTOLIC BLOOD PRESSURE: 84 MMHG | TEMPERATURE: 97.5 F | HEART RATE: 71 BPM | HEIGHT: 74 IN | WEIGHT: 224 LBS | SYSTOLIC BLOOD PRESSURE: 129 MMHG

## 2023-12-04 DIAGNOSIS — R06.2 WHEEZING: ICD-10-CM

## 2023-12-04 DIAGNOSIS — R05.8 PRODUCTIVE COUGH: ICD-10-CM

## 2023-12-04 DIAGNOSIS — J10.1 INFLUENZA B: Primary | ICD-10-CM

## 2023-12-04 DIAGNOSIS — R06.02 SHORTNESS OF BREATH: ICD-10-CM

## 2023-12-04 LAB
INFLUENZA A ANTIGEN, POC: NEGATIVE
INFLUENZA B ANTIGEN, POC: POSITIVE
VALID INTERNAL CONTROL, POC: YES

## 2023-12-04 PROCEDURE — 99214 OFFICE O/P EST MOD 30 MIN: CPT | Performed by: NURSE PRACTITIONER

## 2023-12-04 PROCEDURE — 87804 INFLUENZA ASSAY W/OPTIC: CPT | Performed by: NURSE PRACTITIONER

## 2023-12-04 RX ORDER — OSELTAMIVIR PHOSPHATE 75 MG/1
75 CAPSULE ORAL 2 TIMES DAILY
Qty: 10 CAPSULE | Refills: 0 | Status: SHIPPED | OUTPATIENT
Start: 2023-12-04 | End: 2023-12-09

## 2023-12-04 RX ORDER — BENZONATATE 100 MG/1
100 CAPSULE ORAL 3 TIMES DAILY PRN
Qty: 30 CAPSULE | Refills: 0 | Status: SHIPPED | OUTPATIENT
Start: 2023-12-04

## 2023-12-04 RX ORDER — ALBUTEROL SULFATE 90 UG/1
2 AEROSOL, METERED RESPIRATORY (INHALATION) EVERY 6 HOURS PRN
Qty: 8 G | Refills: 0 | Status: SHIPPED | OUTPATIENT
Start: 2023-12-04

## 2023-12-04 SDOH — ECONOMIC STABILITY: FOOD INSECURITY: WITHIN THE PAST 12 MONTHS, THE FOOD YOU BOUGHT JUST DIDN'T LAST AND YOU DIDN'T HAVE MONEY TO GET MORE.: NEVER TRUE

## 2023-12-04 SDOH — ECONOMIC STABILITY: INCOME INSECURITY: HOW HARD IS IT FOR YOU TO PAY FOR THE VERY BASICS LIKE FOOD, HOUSING, MEDICAL CARE, AND HEATING?: NOT HARD AT ALL

## 2023-12-04 SDOH — ECONOMIC STABILITY: FOOD INSECURITY: WITHIN THE PAST 12 MONTHS, YOU WORRIED THAT YOUR FOOD WOULD RUN OUT BEFORE YOU GOT MONEY TO BUY MORE.: NEVER TRUE

## 2023-12-04 SDOH — ECONOMIC STABILITY: HOUSING INSECURITY
IN THE LAST 12 MONTHS, WAS THERE A TIME WHEN YOU DID NOT HAVE A STEADY PLACE TO SLEEP OR SLEPT IN A SHELTER (INCLUDING NOW)?: NO

## 2023-12-04 ASSESSMENT — PATIENT HEALTH QUESTIONNAIRE - PHQ9
SUM OF ALL RESPONSES TO PHQ9 QUESTIONS 1 & 2: 0
SUM OF ALL RESPONSES TO PHQ QUESTIONS 1-9: 0
2. FEELING DOWN, DEPRESSED OR HOPELESS: 0
1. LITTLE INTEREST OR PLEASURE IN DOING THINGS: 0

## 2023-12-04 ASSESSMENT — ENCOUNTER SYMPTOMS
SINUS PRESSURE: 0
NAUSEA: 0
RHINORRHEA: 0
SINUS PAIN: 0
CHEST TIGHTNESS: 1
WHEEZING: 0
STRIDOR: 0
TROUBLE SWALLOWING: 0
VOICE CHANGE: 0
CHOKING: 0
SHORTNESS OF BREATH: 0
SORE THROAT: 0
COUGH: 1

## 2023-12-04 NOTE — PROGRESS NOTES
[facility]   [id number] [place] [address] [address] Adrianna [place]  P: [phone]  F: [phone]    SUBJECTIVE     HPI:     Cabrera Ruby is a 48 y.o. male who is seen in the clinic for   Chief Complaint   Patient presents with    Chest Congestion     Pt tested neg for COVID 12/04/23 before coming into the office- pt states that congestion started over the weekend- pt complaints of post nasal as well- pt states that he has taken night quil as well as cough medications-         The patient Overweight, Mixed HLD, Bronchitis, and LLE DVT secondary to Left Iliac Vein Abnormality presents to the office today respiratory complaints that began 3 days prior. Endorses a productive cough w/ thick-white mucous. AMB POC Influenza Testing is positive for Influenza B. Patient Active Problem List   Diagnosis    Bronchitis    Acute deep vein thrombosis (DVT) of left lower extremity (HCC)    Hypercoagulable state ([address])        Past Medical History:   Diagnosis Date    Thromboembolus [facility])      Past Surgical History:   Procedure Laterality Date    ANKLE LIGAMENT RECONSTRUCTION Bilateral     KNEE SURGERY Right     ORTHOPEDIC SURGERY      SHOULDER ARTHROSCOPY      VASCULAR SURGERY       Social History     Socioeconomic History    Marital status:      Spouse name: Not on file    Number of children: Not on file    Years of education: Not on file    Highest education level: Not on file   Occupational History    Not on file   Tobacco Use    Smoking status: Never    Smokeless tobacco: Never   Vaping Use    Vaping Use: Never used   Substance and Sexual Activity    Alcohol use: Yes    Drug use: Not Currently    Sexual activity: Not on file   Other Topics Concern    Not on file   Social History Narrative    Remains Sedentary working in IT.      Social Determinants of Health     Financial Resource Strain: Low Risk  (12/4/2023)    Overall Financial Resource Strain (CARDIA)     Difficulty of Paying Living Expenses:

## 2024-02-20 ENCOUNTER — TELEPHONE (OUTPATIENT)
Dept: PRIMARY CARE CLINIC | Facility: CLINIC | Age: 54
End: 2024-02-20

## 2024-02-20 NOTE — TELEPHONE ENCOUNTER
Patient test pos for Covid today he would like to know what he can take over the counter until his virtual appointment Thursday

## 2024-02-22 ENCOUNTER — TELEMEDICINE (OUTPATIENT)
Dept: PRIMARY CARE CLINIC | Facility: CLINIC | Age: 54
End: 2024-02-22
Payer: COMMERCIAL

## 2024-02-22 DIAGNOSIS — R06.2 WHEEZING: ICD-10-CM

## 2024-02-22 DIAGNOSIS — R06.02 SHORTNESS OF BREATH: ICD-10-CM

## 2024-02-22 DIAGNOSIS — U07.1 COVID-19 VIRUS INFECTION: Primary | ICD-10-CM

## 2024-02-22 DIAGNOSIS — R05.8 PRODUCTIVE COUGH: ICD-10-CM

## 2024-02-22 PROCEDURE — 99214 OFFICE O/P EST MOD 30 MIN: CPT | Performed by: NURSE PRACTITIONER

## 2024-02-22 RX ORDER — ALBUTEROL SULFATE 90 UG/1
2 AEROSOL, METERED RESPIRATORY (INHALATION) EVERY 6 HOURS PRN
Qty: 8 G | Refills: 0 | Status: SHIPPED | OUTPATIENT
Start: 2024-02-22

## 2024-02-22 RX ORDER — METHYLPREDNISOLONE 4 MG/1
TABLET ORAL
Qty: 1 KIT | Refills: 0 | Status: SHIPPED | OUTPATIENT
Start: 2024-02-22 | End: 2024-02-28

## 2024-02-22 RX ORDER — GUAIFENESIN/DEXTROMETHORPHAN 100-10MG/5
5 SYRUP ORAL 3 TIMES DAILY PRN
COMMUNITY

## 2024-02-22 RX ORDER — BENZONATATE 100 MG/1
100 CAPSULE ORAL 3 TIMES DAILY PRN
Qty: 30 CAPSULE | Refills: 0 | Status: SHIPPED | OUTPATIENT
Start: 2024-02-22

## 2024-02-22 SDOH — ECONOMIC STABILITY: FOOD INSECURITY: WITHIN THE PAST 12 MONTHS, THE FOOD YOU BOUGHT JUST DIDN'T LAST AND YOU DIDN'T HAVE MONEY TO GET MORE.: NEVER TRUE

## 2024-02-22 SDOH — ECONOMIC STABILITY: FOOD INSECURITY: WITHIN THE PAST 12 MONTHS, YOU WORRIED THAT YOUR FOOD WOULD RUN OUT BEFORE YOU GOT MONEY TO BUY MORE.: NEVER TRUE

## 2024-02-22 SDOH — ECONOMIC STABILITY: INCOME INSECURITY: HOW HARD IS IT FOR YOU TO PAY FOR THE VERY BASICS LIKE FOOD, HOUSING, MEDICAL CARE, AND HEATING?: NOT HARD AT ALL

## 2024-02-22 ASSESSMENT — ENCOUNTER SYMPTOMS
WHEEZING: 1
SINUS PAIN: 1
CHEST TIGHTNESS: 0
SINUS PRESSURE: 1
TROUBLE SWALLOWING: 0
DIARRHEA: 1
RHINORRHEA: 1
ABDOMINAL PAIN: 0
ABDOMINAL DISTENTION: 0
SHORTNESS OF BREATH: 1
SORE THROAT: 0
VOICE CHANGE: 0
VOMITING: 0
COUGH: 1
NAUSEA: 0

## 2024-02-22 ASSESSMENT — PATIENT HEALTH QUESTIONNAIRE - PHQ9
1. LITTLE INTEREST OR PLEASURE IN DOING THINGS: 0
SUM OF ALL RESPONSES TO PHQ QUESTIONS 1-9: 0
2. FEELING DOWN, DEPRESSED OR HOPELESS: 0
SUM OF ALL RESPONSES TO PHQ9 QUESTIONS 1 & 2: 0
SUM OF ALL RESPONSES TO PHQ QUESTIONS 1-9: 0

## 2024-02-22 NOTE — PROGRESS NOTES
[facility]   [address], [address]  [place] [address]  P: [phone]  F: [phone]    SUBJECTIVE   HPI:     Yamil Perkins is a 53 y.o. male who is seen over telehealth for   Chief Complaint   Patient presents with    Positive For Covid-19     02/20/24 Fever,head and chest congestion,headache,diarrhea.Patient recently traveled.        The patient with a PMH of Influenza Type B, LLE DVT, HLD, and Bronchitis presents virtually today stating that he has tested positive for Covid-19 on 2/20.     Symptom onset was on 2/18.     Endorses a productive cough w/ thick-white mucous.         Patient Active Problem List   Diagnosis    Bronchitis    Acute deep vein thrombosis (DVT) of left lower extremity (HCC)    Hypercoagulable state (HCC)          Past Medical History:   Diagnosis Date    Thromboembolus (HCC)      Past Surgical History:   Procedure Laterality Date    ANKLE LIGAMENT RECONSTRUCTION Bilateral     KNEE SURGERY Right     ORTHOPEDIC SURGERY      SHOULDER ARTHROSCOPY      VASCULAR SURGERY       Social History     Socioeconomic History    Marital status:      Spouse name: Not on file    Number of children: Not on file    Years of education: Not on file    Highest education level: Not on file   Occupational History    Not on file   Tobacco Use    Smoking status: Never    Smokeless tobacco: Never   Vaping Use    Vaping Use: Never used   Substance and Sexual Activity    Alcohol use: Yes    Drug use: Not Currently    Sexual activity: Not on file   Other Topics Concern    Not on file   Social History Narrative    Remains Sedentary working in IT.     Social Determinants of Health     Financial Resource Strain: Low Risk  (2/22/2024)    Overall Financial Resource Strain (CARDIA)     Difficulty of Paying Living Expenses: Not hard at all   Food Insecurity: No Food Insecurity (2/22/2024)    Hunger Vital Sign     Worried About Running Out of Food in the Last Year: Never true     Ran Out of Food

## 2024-02-22 NOTE — PROGRESS NOTES
\"Have you been to the ER, urgent care clinic since your last visit?  Hospitalized since your last visit?\"    no    “Have you seen or consulted any other health care providers outside of [facility] since your last visit?”    no    “Have you had a colorectal cancer screening such as a colonoscopy/FIT/Cologuard?    no

## 2024-03-18 DIAGNOSIS — R06.2 WHEEZING: ICD-10-CM

## 2024-03-18 DIAGNOSIS — R05.8 PRODUCTIVE COUGH: ICD-10-CM

## 2024-03-18 DIAGNOSIS — R06.02 SHORTNESS OF BREATH: ICD-10-CM

## 2024-03-18 RX ORDER — ALBUTEROL SULFATE 90 UG/1
2 AEROSOL, METERED RESPIRATORY (INHALATION) EVERY 6 HOURS PRN
Qty: 1 EACH | Refills: 0 | Status: SHIPPED | OUTPATIENT
Start: 2024-03-18

## 2024-03-29 DIAGNOSIS — Z79.01 LONG TERM (CURRENT) USE OF ANTICOAGULANTS: ICD-10-CM

## 2024-03-29 DIAGNOSIS — I87.9: ICD-10-CM

## 2024-03-29 DIAGNOSIS — Z86.718 HISTORY OF DVT OF LOWER EXTREMITY: ICD-10-CM

## 2024-03-29 DIAGNOSIS — Z95.828 PRESENCE OF IVC FILTER: ICD-10-CM

## 2024-03-29 RX ORDER — APIXABAN 5 MG/1
5 TABLET, FILM COATED ORAL 2 TIMES DAILY
Qty: 180 TABLET | Refills: 0 | Status: SHIPPED | OUTPATIENT
Start: 2024-03-29

## 2024-04-17 ENCOUNTER — COMMUNITY OUTREACH (OUTPATIENT)
Dept: PRIMARY CARE CLINIC | Facility: CLINIC | Age: 54
End: 2024-04-17

## 2024-06-19 DIAGNOSIS — Z86.718 HISTORY OF DVT OF LOWER EXTREMITY: ICD-10-CM

## 2024-06-19 DIAGNOSIS — I87.9: ICD-10-CM

## 2024-06-19 DIAGNOSIS — Z79.01 LONG TERM (CURRENT) USE OF ANTICOAGULANTS: ICD-10-CM

## 2024-06-19 DIAGNOSIS — Z95.828 PRESENCE OF IVC FILTER: ICD-10-CM

## 2024-06-20 RX ORDER — APIXABAN 5 MG/1
5 TABLET, FILM COATED ORAL 2 TIMES DAILY
Qty: 60 TABLET | Refills: 2 | OUTPATIENT
Start: 2024-06-20

## 2024-07-23 DIAGNOSIS — I87.9: ICD-10-CM

## 2024-07-23 DIAGNOSIS — Z95.828 PRESENCE OF IVC FILTER: ICD-10-CM

## 2024-07-23 DIAGNOSIS — Z86.718 HISTORY OF DVT OF LOWER EXTREMITY: ICD-10-CM

## 2024-07-23 DIAGNOSIS — Z79.01 LONG TERM (CURRENT) USE OF ANTICOAGULANTS: ICD-10-CM

## 2024-07-23 NOTE — TELEPHONE ENCOUNTER
Patient requested refills on Eliquis 5mg to be sent to [facility] on [address].  Patient made a NTP appt with Willa Sauceda on 8/20.

## 2024-07-23 NOTE — TELEPHONE ENCOUNTER
Requested Prescriptions     Pending Prescriptions Disp Refills    apixaban (ELIQUIS) 5 MG TABS tablet 60 tablet 0     Sig: Take 1 tablet by mouth 2 times daily Appointment with new PCP required for further refills.        Last Visit 2/22/24 VV  Last Refill 3/29/24

## 2024-08-19 SDOH — HEALTH STABILITY: PHYSICAL HEALTH: ON AVERAGE, HOW MANY DAYS PER WEEK DO YOU ENGAGE IN MODERATE TO STRENUOUS EXERCISE (LIKE A BRISK WALK)?: 3 DAYS

## 2024-08-19 SDOH — HEALTH STABILITY: PHYSICAL HEALTH: ON AVERAGE, HOW MANY MINUTES DO YOU ENGAGE IN EXERCISE AT THIS LEVEL?: 30 MIN

## 2024-08-20 ENCOUNTER — OFFICE VISIT (OUTPATIENT)
Dept: PRIMARY CARE CLINIC | Facility: CLINIC | Age: 54
End: 2024-08-20
Payer: COMMERCIAL

## 2024-08-20 VITALS
TEMPERATURE: 98.2 F | RESPIRATION RATE: 14 BRPM | HEIGHT: 74 IN | DIASTOLIC BLOOD PRESSURE: 86 MMHG | WEIGHT: 224.6 LBS | HEART RATE: 85 BPM | SYSTOLIC BLOOD PRESSURE: 127 MMHG | BODY MASS INDEX: 28.83 KG/M2

## 2024-08-20 DIAGNOSIS — E66.3 OVERWEIGHT (BMI 25.0-29.9): ICD-10-CM

## 2024-08-20 DIAGNOSIS — E78.5 HYPERLIPIDEMIA LDL GOAL <100: Primary | ICD-10-CM

## 2024-08-20 DIAGNOSIS — Z86.718 HISTORY OF DVT OF LOWER EXTREMITY: ICD-10-CM

## 2024-08-20 DIAGNOSIS — Z95.828 PRESENCE OF IVC FILTER: ICD-10-CM

## 2024-08-20 DIAGNOSIS — I87.9: ICD-10-CM

## 2024-08-20 DIAGNOSIS — Z01.89 ENCOUNTER FOR ROUTINE LABORATORY TESTING: ICD-10-CM

## 2024-08-20 DIAGNOSIS — Z12.11 COLON CANCER SCREENING: ICD-10-CM

## 2024-08-20 DIAGNOSIS — D68.59 OTHER PRIMARY THROMBOPHILIA (HCC): ICD-10-CM

## 2024-08-20 DIAGNOSIS — Z23 ENCOUNTER FOR IMMUNIZATION: ICD-10-CM

## 2024-08-20 DIAGNOSIS — Z79.01 LONG TERM (CURRENT) USE OF ANTICOAGULANTS: ICD-10-CM

## 2024-08-20 PROCEDURE — 99213 OFFICE O/P EST LOW 20 MIN: CPT

## 2024-08-20 PROCEDURE — 90750 HZV VACC RECOMBINANT IM: CPT

## 2024-08-20 PROCEDURE — 90471 IMMUNIZATION ADMIN: CPT

## 2024-08-20 SDOH — ECONOMIC STABILITY: FOOD INSECURITY: WITHIN THE PAST 12 MONTHS, YOU WORRIED THAT YOUR FOOD WOULD RUN OUT BEFORE YOU GOT MONEY TO BUY MORE.: NEVER TRUE

## 2024-08-20 SDOH — ECONOMIC STABILITY: FOOD INSECURITY: WITHIN THE PAST 12 MONTHS, THE FOOD YOU BOUGHT JUST DIDN'T LAST AND YOU DIDN'T HAVE MONEY TO GET MORE.: NEVER TRUE

## 2024-08-20 SDOH — ECONOMIC STABILITY: INCOME INSECURITY: HOW HARD IS IT FOR YOU TO PAY FOR THE VERY BASICS LIKE FOOD, HOUSING, MEDICAL CARE, AND HEATING?: NOT HARD AT ALL

## 2024-08-20 ASSESSMENT — ENCOUNTER SYMPTOMS
VOMITING: 0
WHEEZING: 0
ABDOMINAL PAIN: 0
SHORTNESS OF BREATH: 0
COUGH: 0
DIARRHEA: 0
CONSTIPATION: 0
BLOOD IN STOOL: 0
NAUSEA: 0

## 2024-08-20 ASSESSMENT — PATIENT HEALTH QUESTIONNAIRE - PHQ9
SUM OF ALL RESPONSES TO PHQ9 QUESTIONS 1 & 2: 0
SUM OF ALL RESPONSES TO PHQ QUESTIONS 1-9: 0
2. FEELING DOWN, DEPRESSED OR HOPELESS: NOT AT ALL
SUM OF ALL RESPONSES TO PHQ QUESTIONS 1-9: 0
1. LITTLE INTEREST OR PLEASURE IN DOING THINGS: NOT AT ALL
SUM OF ALL RESPONSES TO PHQ QUESTIONS 1-9: 0
SUM OF ALL RESPONSES TO PHQ QUESTIONS 1-9: 0

## 2024-08-20 NOTE — PROGRESS NOTES
\"Have you been to the ER, urgent care clinic since your last visit?  Hospitalized since your last visit?\"    no    “Have you seen or consulted any other health care providers outside of [facility] since your last visit?”    Vascular Surgeon       “Have you had a colorectal cancer screening such as a colonoscopy/FIT/Cologuard?    Never    No colonoscopy on file  No cologuard on file  No FIT/FOBT on file   No flexible sigmoidoscopy on file         Click Here for Release of Records Request    
to improve, for physical, with labs prior.     The past medical history, past surgical history, and family history were reviewed and updated in the medical record.  Lab values/Imaging were reviewed.    The medications were reviewed and updated in the medical record.   Immunizations were reviewed and updated in the medical record.  All relevant preventative screenings reviewed and updated in the medical record.    Disclaimer:  Advised patient to call back or return to office if symptoms worsen/change/persist.  Discussed expected course/resolution/complications of diagnosis in detail with patient.     Medication risks/benefits/alternatives discussed with patient.  Patient was given an after visit summary which includes diagnoses, current medications, & vitals.      Discussed patient instructions and advised to read to all patient instructions regarding care.      Patient expressed understanding with the diagnosis and plan.       Willa Sauceda PA-C  8/20/2024

## 2025-02-13 ENCOUNTER — TELEMEDICINE (OUTPATIENT)
Age: 55
End: 2025-02-13
Payer: COMMERCIAL

## 2025-02-13 DIAGNOSIS — G31.84 MILD COGNITIVE IMPAIRMENT: Primary | ICD-10-CM

## 2025-02-13 DIAGNOSIS — R45.89 ANXIETY ABOUT HEALTH: ICD-10-CM

## 2025-02-13 DIAGNOSIS — R41.89 COGNITIVE DECLINE: ICD-10-CM

## 2025-02-13 PROCEDURE — 90791 PSYCH DIAGNOSTIC EVALUATION: CPT | Performed by: CLINICAL NEUROPSYCHOLOGIST

## 2025-02-13 NOTE — PROGRESS NOTES
Yamil Perkins, was evaluated through a synchronous (real-time) audio-video encounter. The patient (or guardian if applicable) is aware that this is a billable service, which includes applicable co-pays. This Virtual Visit was conducted with patient's (and/or legal guardian's) consent. Patient identification was verified, and a caregiver was present when appropriate.   The patient was located at Home: [address] [place] [address] [facility] [id number]  Provider was located at Facility (Appt Dept): [address] [facility]  [address] [place] [address]  Confirm you are appropriately licensed, registered, or certified to deliver care in the state where the patient is located as indicated above. If you are not or unsure, please re-schedule the visit: Yes, I confirm.     Yamil Perkins (:  1970) is a New patient, presenting virtually for evaluation of the following:        [facility]Y [facility]   [address] [place] [address] [phone] Office   [phone] Fax      Neuropsychology    Initial Diagnostic Interview Note      Referral:  Willa Sauceda, KENNA    Yamil Perkins is a 54 y.o. ;eft handed   male who was unaccompanied to the initial clinical interview on 2025.  Please refer to his medical records for details pertaining to his history.   At the start of the appointment, I reviewed the patient's [facility] Epic Chart (including Media scanned in from previous providers) for the active Problem List, all pertinent Past Medical Hx, medications, recent radiologic and laboratory findings.  In addition, I reviewed pt's documented Immunization Record and Encounter History.     Chief Complaint: New patient, establish care, for neurocognitive and psychologic concerns, as outlined above.       He completed college without history of previously diagnosed LD and/or receipt of special

## 2025-02-24 ENCOUNTER — TELEPHONE (OUTPATIENT)
Age: 55
End: 2025-02-24

## 2025-02-26 ENCOUNTER — TELEPHONE (OUTPATIENT)
Dept: PRIMARY CARE CLINIC | Facility: CLINIC | Age: 55
End: 2025-02-26

## 2025-03-10 ENCOUNTER — TELEPHONE (OUTPATIENT)
Age: 55
End: 2025-03-10

## 2025-03-21 ENCOUNTER — PROCEDURE VISIT (OUTPATIENT)
Age: 55
End: 2025-03-21
Payer: COMMERCIAL

## 2025-03-21 DIAGNOSIS — G31.84 MILD COGNITIVE IMPAIRMENT: Primary | ICD-10-CM

## 2025-03-21 DIAGNOSIS — F90.0 ATTENTION DEFICIT HYPERACTIVITY DISORDER (ADHD), INATTENTIVE TYPE, MILD: Chronic | ICD-10-CM

## 2025-03-21 PROCEDURE — 96133 NRPSYC TST EVAL PHYS/QHP EA: CPT | Performed by: CLINICAL NEUROPSYCHOLOGIST

## 2025-03-21 PROCEDURE — 96132 NRPSYC TST EVAL PHYS/QHP 1ST: CPT | Performed by: CLINICAL NEUROPSYCHOLOGIST

## 2025-03-21 PROCEDURE — 96139 PSYCL/NRPSYC TST TECH EA: CPT | Performed by: CLINICAL NEUROPSYCHOLOGIST

## 2025-03-21 PROCEDURE — 96138 PSYCL/NRPSYC TECH 1ST: CPT | Performed by: CLINICAL NEUROPSYCHOLOGIST

## 2025-04-18 ENCOUNTER — OFFICE VISIT (OUTPATIENT)
Dept: PRIMARY CARE CLINIC | Facility: CLINIC | Age: 55
End: 2025-04-18

## 2025-04-18 VITALS
SYSTOLIC BLOOD PRESSURE: 130 MMHG | HEART RATE: 71 BPM | DIASTOLIC BLOOD PRESSURE: 79 MMHG | WEIGHT: 235.4 LBS | TEMPERATURE: 97.3 F | RESPIRATION RATE: 18 BRPM | HEIGHT: 74 IN | BODY MASS INDEX: 30.21 KG/M2 | OXYGEN SATURATION: 96 %

## 2025-04-18 DIAGNOSIS — E78.5 HYPERLIPIDEMIA LDL GOAL <100: ICD-10-CM

## 2025-04-18 DIAGNOSIS — Z00.00 ANNUAL PHYSICAL EXAM: Primary | ICD-10-CM

## 2025-04-18 DIAGNOSIS — Z23 NEED FOR SHINGLES VACCINE: ICD-10-CM

## 2025-04-18 DIAGNOSIS — Z23 NEED FOR PNEUMOCOCCAL VACCINATION: ICD-10-CM

## 2025-04-18 DIAGNOSIS — Z79.01 LONG TERM (CURRENT) USE OF ANTICOAGULANTS: ICD-10-CM

## 2025-04-18 DIAGNOSIS — D68.59 OTHER PRIMARY THROMBOPHILIA: ICD-10-CM

## 2025-04-18 DIAGNOSIS — Z86.718 HISTORY OF DVT OF LOWER EXTREMITY: ICD-10-CM

## 2025-04-18 DIAGNOSIS — Z01.84 IMMUNITY STATUS TESTING: ICD-10-CM

## 2025-04-18 SDOH — ECONOMIC STABILITY: FOOD INSECURITY: WITHIN THE PAST 12 MONTHS, YOU WORRIED THAT YOUR FOOD WOULD RUN OUT BEFORE YOU GOT MONEY TO BUY MORE.: NEVER TRUE

## 2025-04-18 SDOH — ECONOMIC STABILITY: FOOD INSECURITY: WITHIN THE PAST 12 MONTHS, THE FOOD YOU BOUGHT JUST DIDN'T LAST AND YOU DIDN'T HAVE MONEY TO GET MORE.: NEVER TRUE

## 2025-04-18 ASSESSMENT — ENCOUNTER SYMPTOMS
ABDOMINAL PAIN: 0
RHINORRHEA: 0
SORE THROAT: 0
SINUS PAIN: 0
BLOOD IN STOOL: 0
VOMITING: 0
CONSTIPATION: 0
SHORTNESS OF BREATH: 0
NAUSEA: 0
COUGH: 0
DIARRHEA: 0
WHEEZING: 0

## 2025-04-18 ASSESSMENT — PATIENT HEALTH QUESTIONNAIRE - PHQ9
2. FEELING DOWN, DEPRESSED OR HOPELESS: NOT AT ALL
SUM OF ALL RESPONSES TO PHQ QUESTIONS 1-9: 0
1. LITTLE INTEREST OR PLEASURE IN DOING THINGS: NOT AT ALL

## 2025-04-18 NOTE — PROGRESS NOTES
[facility]   [address], [address]  [place] [address]  P: [phone]  F: [phone]    SUBJECTIVE     HPI:     Yamil Perkins is a 54 y.o. male who is seen in the clinic for   Chief Complaint   Patient presents with    Annual Exam        The patient presents to the office today for a physical exam. Not fasting today.    Hyperlipidemia LDL goal <100  Admits he has not been eating well recently. Not exercising regularly. Has traveled every week over the past 4 months. Tries to grab salads when he can.    Other primary thrombophilia, History of DVT of lower extremity, Long term (current) use of anticoagulants  Compliant on Eliquis 5 mg BID. Admits that occasionally he misses his evening dose.    Will have RACHEL levator advancement with Dr. Gomez on 4/24/25. Has a form requesting approval to hold Eliquis 3 days prior to procedure.    He is interested in GLP-1 agonist for weight loss. No personal or family history of thyroid cancer or pancreatitis.    Health screenings:   Eye exam Done 2/2025  Dental exam Overdue 2 years ago  Colon cancer screening was referred at last visit but has not scheduled  Tdap Done 10/6/2023  COVID vaccine Done 2021    Shingles vaccine   First dose 8/20/2024  Pneumonia vaccine  given today    Patient Active Problem List   Diagnosis    Bronchitis    Acute deep vein thrombosis (DVT) of left lower extremity    Hypercoagulable state        Past Medical History:   Diagnosis Date    Thromboembolus (HCC)      Current Outpatient Medications   Medication Sig Dispense Refill    apixaban (ELIQUIS) 5 MG TABS tablet Take 1 tablet by mouth 2 times daily 180 tablet 3    acetaminophen (TYLENOL) 325 MG tablet Take by mouth every 4 hours as needed      albuterol sulfate HFA (PROVENTIL;VENTOLIN;PROAIR) 108 (90 Base) MCG/ACT inhaler INHALE 2 PUFFS INTO THE LUNGS EVERY 6 HOURS AS NEEDED FOR WHEEZING OR SHORTNESS OF BREATH (Patient not taking: Reported on 4/18/2025) 1 each 0     No current

## 2025-04-18 NOTE — PROGRESS NOTES
Health Decision Maker has been checked with the patient          Chief Complaint   Patient presents with    Annual Exam       \"Have you been to the ER, urgent care clinic since your last visit?  Hospitalized since your last visit?\"    NO    “Have you seen or consulted any other health care providers outside of [facility] since your last visit?”    Yes, eye doctor      Vitals:    04/18/25 1510   Temp: 97.3 °F (36.3 °C)   TempSrc: Temporal   Weight: 106.8 kg (235 lb 6.4 oz)   Height: 1.88 m (6' 2\")           “Have you had a colorectal cancer screening such as a colonoscopy/FIT/Cologuard?    NO           Click Here for Release of Records Request

## 2025-04-23 ENCOUNTER — OFFICE VISIT (OUTPATIENT)
Age: 55
End: 2025-04-23
Payer: COMMERCIAL

## 2025-04-23 DIAGNOSIS — F90.0 ATTENTION DEFICIT HYPERACTIVITY DISORDER (ADHD), INATTENTIVE TYPE, MILD: Chronic | ICD-10-CM

## 2025-04-23 DIAGNOSIS — G31.84 MILD COGNITIVE IMPAIRMENT: Primary | ICD-10-CM

## 2025-04-23 PROCEDURE — 96132 NRPSYC TST EVAL PHYS/QHP 1ST: CPT | Performed by: CLINICAL NEUROPSYCHOLOGIST

## 2025-04-23 NOTE — PROGRESS NOTES
Chief Complaint:  Follow up to discuss test results with this established patient and spouse related to neurocognitive and psychologic functioning, cognitive concerns and emotional/mood concerns as outlined below.     A neuropsychological evaluation was completed with the patient on 3/21/2025     Prior to seeing the patient for today's visit, I reviewed pertinent records, including the previously completed report, the records in Epic, and any updated visits from other providers since the patient's last visit.     During today's appointment I administered the following measures: NMSE, Fluency.  Exam normal          I provided feedback services related to the previously completed report. Attendees included: Patient and spouse. Education was provided regarding my diagnostic impressions, and we discussed treatment plan/options. Attendees were provided with the opportunity to ask questions, which were answered to the best of my ability.     We discussed, in detail, the following:      This patient generated a mixed normal/abnormal range Neuropsychological Evaluation with respect to neurocognitive functioning.  In this regard, impairments are noted for verbal fluency, confrontation naming, sustained visual attention, auditory attention, and dominant handed motor skills.  The latter may reflect lateralization of which neurologic correlation is indicated.  Otherwise, general learning, memory, and performances across all other neurocognitive domains assessed are normal.  From an emotional standpoint, the patient denied clinically significant psychopathology.                   Reassuringly, this profile is not consistent with dementia.  At the same time, this is mild cognitive impairment and I also wonder about a chronic underlying ADHD-inattentive concern.  I suggest consideration for medication management for attention if this is not medically contraindicated as well as consults with speech and OT for fluency and motor

## (undated) DEVICE — SOLUTION IRRIG 1000ML 0.9% SOD CHL USP POUR PLAS BTL

## (undated) DEVICE — CATHETER ANGIO 5FR L70CM 0.035IN SEG 20CM PGTL FLSH 20 1

## (undated) DEVICE — C-ARM: Brand: UNBRANDED

## (undated) DEVICE — SUTURE VCRL SZ 3-0 L27IN ABSRB UD L26MM SH 1/2 CIR J416H

## (undated) DEVICE — GLOVE ORTHO 8   MSG9480

## (undated) DEVICE — BALLOON DILATATION CATHETER: Brand: XXL™ ESOPHAGEAL

## (undated) DEVICE — SUT ETHLN 3-0 18IN PS2 BLK --

## (undated) DEVICE — SUTURE VCRL SZ 2-0 L36IN ABSRB UD L40MM CT 1/2 CIR J957H

## (undated) DEVICE — BANDAGE,GAUZE,BULKEE II,4.5"X4.1YD,STRL: Brand: MEDLINE

## (undated) DEVICE — PINNACLE INTRODUCER SHEATH: Brand: PINNACLE

## (undated) DEVICE — RADIFOCUS GLIDEWIRE ADVANTAGE GUIDEWIRE: Brand: GLIDEWIRE ADVANTAGE

## (undated) DEVICE — PACK PROCEDURE SURG HRT CATH

## (undated) DEVICE — ATLAS® GOLD PTA DILATATION CATHETER 20 MM X 40 MM, 80 CM CATHETER: Brand: ATLAS® GOLD

## (undated) DEVICE — 40418 TRENDELENBURG ONE-STEP ARM PROTECTORS LARGE (1 PAIR): Brand: 40418 TRENDELENBURG ONE-STEP ARM PROTECTORS LARGE (1 PAIR)

## (undated) DEVICE — 3M™ TEGADERM™ TRANSPARENT FILM DRESSING FRAME STYLE WITH BORDER, 1616, 4 IN X 4-3/4 IN (10 CM X 12 CM), 50/CT 4CT/CASE: Brand: 3M™ TEGADERM™

## (undated) DEVICE — PROVE COVER: Brand: UNBRANDED

## (undated) DEVICE — Device: Brand: VISIONS PV .035 DIGITAL IVUS CATHETER

## (undated) DEVICE — SUT CHRMC 3-0 27IN SH BRN --

## (undated) DEVICE — Device

## (undated) DEVICE — CATHETER ANGIO 5FR L90CM 0.035IN OMNI FLSH SFT RADPQ TIP HI

## (undated) DEVICE — SUTURE PROL SZ 5-0 L30IN NONABSORBABLE BLU L13MM RB-2 1/2 8710H

## (undated) DEVICE — SUTURE PERMAHAND SZ 2-0 L30IN NONABSORBABLE BLK SH L26MM C016D

## (undated) DEVICE — VASCULAR-SMH: Brand: MEDLINE INDUSTRIES, INC.

## (undated) DEVICE — HANDLE LT SNAP ON ULT DURABLE LENS FOR TRUMPF ALC DISPOSABLE

## (undated) DEVICE — HYPODERMIC SAFETY NEEDLE: Brand: MONOJECT

## (undated) DEVICE — AGENT HEMSTAT W4XL4IN OXIDIZED REGENERATED CELOS ABSRB SFT

## (undated) DEVICE — SYR 3ML LL TIP 1/10ML GRAD --

## (undated) DEVICE — SUT PROL 6-0 18IN BV1 DA BLU --

## (undated) DEVICE — 3M™ TEGADERM™ TRANSPARENT FILM DRESSING FRAME STYLE, 1629, 8 IN X 12 IN (20 CM X 30 CM), 10/CT 8CT/CASE: Brand: 3M™ TEGADERM™

## (undated) DEVICE — SOLUTION IRRIG 1000ML STRL H2O USP PLAS POUR BTL

## (undated) DEVICE — BAG ISOL TRNSPRT 18X18.5IN LF --

## (undated) DEVICE — DEVICE INFL 20ML L13IN 30ATM CLR POLYCARB TB PRTBL DGT

## (undated) DEVICE — SOL INJ SOD CL 0.9% 500ML BG --

## (undated) DEVICE — TOWEL,OR,DSP,ST,BLUE,STD,2/PK,40PK/CS: Brand: MEDLINE

## (undated) DEVICE — MICROPUNCTURE INTRODUCER SET SILHOUETTE TRANSITIONLESS WITH STAINLESS STEEL WIRE GUIDE: Brand: MICROPUNCTURE

## (undated) DEVICE — ASSEMBLY CANSTR ENGINE -- INDIGO ASPIRATION SYSTEM

## (undated) DEVICE — BALLOON DILATATION CATHETER: Brand: XXL™ VASCULAR

## (undated) DEVICE — 3M™ TEGADERM™ TRANSPARENT FILM DRESSING FRAME STYLE, 1626W, 4 IN X 4-3/4 IN (10 CM X 12 CM), 50/CT 4CT/CASE: Brand: 3M™ TEGADERM™

## (undated) DEVICE — FOGARTY ARTERIAL EMBOLECTOMY CATHETER 4F 80CM: Brand: FOGARTY

## (undated) DEVICE — 3M™ TEGADERM™ TRANSPARENT FILM DRESSING FRAME STYLE, 1628, 6 IN X 8 IN (15 CM X 20 CM), 10/CT 8CT/CASE: Brand: 3M™ TEGADERM™